# Patient Record
Sex: MALE | Race: WHITE | NOT HISPANIC OR LATINO | Employment: OTHER | URBAN - METROPOLITAN AREA
[De-identification: names, ages, dates, MRNs, and addresses within clinical notes are randomized per-mention and may not be internally consistent; named-entity substitution may affect disease eponyms.]

---

## 2022-08-16 PROBLEM — I47.20 SUSTAINED VT (VENTRICULAR TACHYCARDIA): Status: ACTIVE | Noted: 2022-08-16

## 2022-08-16 PROBLEM — I10 HTN (HYPERTENSION): Status: ACTIVE | Noted: 2022-08-16

## 2022-08-16 PROBLEM — R94.31 PROLONGATION OF QRS COMPLEX ON ELECTROCARDIOGRAPHY: Status: ACTIVE | Noted: 2022-08-16

## 2022-08-17 PROBLEM — I45.10 RBBB: Status: ACTIVE | Noted: 2022-08-17

## 2022-08-17 PROBLEM — I50.42 CHF (CONGESTIVE HEART FAILURE), NYHA CLASS II, CHRONIC, COMBINED: Status: ACTIVE | Noted: 2022-08-17

## 2022-08-17 PROBLEM — R00.1 SYMPTOMATIC BRADYCARDIA: Status: ACTIVE | Noted: 2022-08-17

## 2022-08-17 PROBLEM — I42.8 NON-ISCHEMIC CARDIOMYOPATHY: Status: ACTIVE | Noted: 2022-08-17

## 2023-01-09 ENCOUNTER — PATIENT MESSAGE (OUTPATIENT)
Dept: PEDIATRIC CARDIOLOGY | Facility: CLINIC | Age: 53
End: 2023-01-09
Payer: COMMERCIAL

## 2023-01-09 ENCOUNTER — TELEPHONE (OUTPATIENT)
Dept: PEDIATRIC CARDIOLOGY | Facility: CLINIC | Age: 53
End: 2023-01-09
Payer: COMMERCIAL

## 2023-01-09 DIAGNOSIS — Q24.9 ADULT CONGENITAL HEART DISEASE: ICD-10-CM

## 2023-01-09 DIAGNOSIS — R94.31 PROLONGATION OF QRS COMPLEX ON ELECTROCARDIOGRAPHY: Primary | ICD-10-CM

## 2023-01-09 DIAGNOSIS — Q21.3 TOF (TETRALOGY OF FALLOT): ICD-10-CM

## 2023-01-09 DIAGNOSIS — I42.8 NON-ISCHEMIC CARDIOMYOPATHY: ICD-10-CM

## 2023-01-09 DIAGNOSIS — I50.42 CHF (CONGESTIVE HEART FAILURE), NYHA CLASS II, CHRONIC, COMBINED: ICD-10-CM

## 2023-01-09 NOTE — TELEPHONE ENCOUNTER
Appt scheduled for 2/23 start time 1:30, all information left on voicemail including callback name and number. Appointment slips mailed to address on file. MY Chart message sent regarding appointments. Spoke with Mirtha MOSQUEDA regarding clinic notes, cath and echo reports faxed to our department.

## 2023-01-10 ENCOUNTER — DOCUMENTATION ONLY (OUTPATIENT)
Dept: PEDIATRIC CARDIOLOGY | Facility: HOSPITAL | Age: 53
End: 2023-01-10
Payer: COMMERCIAL

## 2023-01-10 NOTE — PROGRESS NOTES
I reviewed medical records from [facility] in [facility].  Patient seen there September 23, 2022.    1. History of tetralogy of Fallot repair   - echocardiogram September 23, 2022 with reportedly mild to moderate pulmonary insufficiency and mild-to-moderate tricuspid insufficiency estimating right ventricular systolic pressure 55 mmHg.  Reportedly normal gradient across the pulmonary valve although the reported high-pitched systolic murmur would suggest the possibility of pulmonary stenosis.  - LPA narrowing on CTA 8/16/22 at [facility].  2. History of ventricular tachycardia status post ICD implantation  3. Reported history of pulmonary hypertension  4. Reported history of nonischemic cardiomyopathy  - ejection fraction 55% on most recent echocardiogram  5. Hypertension  6. Family history of coronary artery disease, father with history  7. Mild aortic insufficiency    In clinic his blood pressure was 130/79, heart rate 60, BMI 23.5.  A high-pitched harsh mid systolic murmur was present with radiation to the neck.    Patient was on Aldactone 25 mg twice a day, amiodarone, carvedilol 6.25 mg twice a day, Entresto 49/51 mg twice a day, Lasix 20 mg as needed, and Zyrtec.    Patient had blood work performed August 23, 2022.  NT pro BNP elevated at 252 with 121 being the upper limit of normal.  Basic metabolic panel normal with creatinine 1.2, potassium 5.  Sodium 138.  Calcium 10.      Blood work July 21, 2022:  Total cholesterol 186, HDL 39, , non HDL cholesterol elevated at 147.  Albumin 4.3.  Bilirubin upper limit of normal at 1.8.  AST and ALT normal.      Transthoracic echocardiogram September 23, 2022 with normal left ventricular systolic function, ejection fraction 55%.  No evidence of diastolic dysfunction.  Mild asymmetric septal left ventricular hypertrophy present.  Global longitudinal strain-16.8%.  Mild left atrial enlargement.  Reportedly with mild to moderate  pulmonary insufficiency and mild-to-moderate tricuspid insufficiency with estimated pulmonary artery systolic pressure 55 mmHg.  Peak velocity across the pulmonary valve reported as 0.5 m/sec.  Right ventricular function reported as normal.  TAPSE 2.2 cm.  Aortic root 3.1 cm.  Aortic valve with mild aortic insufficiency.    EKG performed September 23, 2022 with AV sequential pacing.    Patient had a chest CTA August 16, 2022 at [facility]:  1. No evidence of pulmonary embolism  2. Marked narrowing of the proximal aspect of the left main pulmonary artery and diffuse enlargement of the right pulmonary artery     I am seeing this patient February 23, 2023.  We will request a copy of the CTA.  May consider cardiac catheterization to treat LPA stenosis.  We will review echocardiogram at that time and consider possible need for a cardiac MRI.

## 2023-02-06 ENCOUNTER — TELEPHONE (OUTPATIENT)
Dept: TRANSPLANT | Facility: CLINIC | Age: 53
End: 2023-02-06
Payer: COMMERCIAL

## 2023-02-06 DIAGNOSIS — R06.82 TACHYPNEA: Primary | ICD-10-CM

## 2023-02-06 DIAGNOSIS — I27.9 CHRONIC PULMONARY HEART DISEASE: ICD-10-CM

## 2023-02-06 DIAGNOSIS — Z79.899 POLYPHARMACY: ICD-10-CM

## 2023-03-02 ENCOUNTER — OFFICE VISIT (OUTPATIENT)
Dept: TRANSPLANT | Facility: CLINIC | Age: 53
End: 2023-03-02
Payer: COMMERCIAL

## 2023-03-02 ENCOUNTER — LAB VISIT (OUTPATIENT)
Dept: LAB | Facility: HOSPITAL | Age: 53
End: 2023-03-02
Attending: INTERNAL MEDICINE
Payer: COMMERCIAL

## 2023-03-02 VITALS
WEIGHT: 159.19 LBS | DIASTOLIC BLOOD PRESSURE: 83 MMHG | SYSTOLIC BLOOD PRESSURE: 154 MMHG | HEIGHT: 69 IN | OXYGEN SATURATION: 96 % | HEART RATE: 67 BPM | BODY MASS INDEX: 23.58 KG/M2

## 2023-03-02 DIAGNOSIS — R06.82 TACHYPNEA: ICD-10-CM

## 2023-03-02 DIAGNOSIS — Z79.899 POLYPHARMACY: ICD-10-CM

## 2023-03-02 DIAGNOSIS — I27.20 PULMONARY HYPERTENSION: ICD-10-CM

## 2023-03-02 LAB
ALBUMIN SERPL BCP-MCNC: 4.7 G/DL (ref 3.5–5.2)
ALP SERPL-CCNC: 46 U/L (ref 55–135)
ALT SERPL W/O P-5'-P-CCNC: 32 U/L (ref 10–44)
ANION GAP SERPL CALC-SCNC: 7 MMOL/L (ref 8–16)
AST SERPL-CCNC: 24 U/L (ref 10–40)
BASOPHILS # BLD AUTO: 0.09 K/UL (ref 0–0.2)
BASOPHILS NFR BLD: 0.9 % (ref 0–1.9)
BILIRUB SERPL-MCNC: 1.5 MG/DL (ref 0.1–1)
BNP SERPL-MCNC: 177 PG/ML (ref 0–99)
BUN SERPL-MCNC: 15 MG/DL (ref 6–20)
CALCIUM SERPL-MCNC: 10.5 MG/DL (ref 8.7–10.5)
CHLORIDE SERPL-SCNC: 105 MMOL/L (ref 95–110)
CO2 SERPL-SCNC: 28 MMOL/L (ref 23–29)
CREAT SERPL-MCNC: 1.3 MG/DL (ref 0.5–1.4)
DIFFERENTIAL METHOD: ABNORMAL
EOSINOPHIL # BLD AUTO: 0.8 K/UL (ref 0–0.5)
EOSINOPHIL NFR BLD: 8.1 % (ref 0–8)
ERYTHROCYTE [DISTWIDTH] IN BLOOD BY AUTOMATED COUNT: 13.4 % (ref 11.5–14.5)
EST. GFR  (NO RACE VARIABLE): >60 ML/MIN/1.73 M^2
GLUCOSE SERPL-MCNC: 96 MG/DL (ref 70–110)
HCT VFR BLD AUTO: 45.7 % (ref 40–54)
HGB BLD-MCNC: 14.2 G/DL (ref 14–18)
IMM GRANULOCYTES # BLD AUTO: 0.01 K/UL (ref 0–0.04)
IMM GRANULOCYTES NFR BLD AUTO: 0.1 % (ref 0–0.5)
LYMPHOCYTES # BLD AUTO: 3.9 K/UL (ref 1–4.8)
LYMPHOCYTES NFR BLD: 39.2 % (ref 18–48)
MAGNESIUM SERPL-MCNC: 2.3 MG/DL (ref 1.6–2.6)
MCH RBC QN AUTO: 31.3 PG (ref 27–31)
MCHC RBC AUTO-ENTMCNC: 31.1 G/DL (ref 32–36)
MCV RBC AUTO: 101 FL (ref 82–98)
MONOCYTES # BLD AUTO: 0.8 K/UL (ref 0.3–1)
MONOCYTES NFR BLD: 8 % (ref 4–15)
NEUTROPHILS # BLD AUTO: 4.3 K/UL (ref 1.8–7.7)
NEUTROPHILS NFR BLD: 43.7 % (ref 38–73)
NRBC BLD-RTO: 0 /100 WBC
PLATELET # BLD AUTO: 250 K/UL (ref 150–450)
PMV BLD AUTO: 10.6 FL (ref 9.2–12.9)
POTASSIUM SERPL-SCNC: 5 MMOL/L (ref 3.5–5.1)
PROT SERPL-MCNC: 8.2 G/DL (ref 6–8.4)
RBC # BLD AUTO: 4.53 M/UL (ref 4.6–6.2)
SODIUM SERPL-SCNC: 140 MMOL/L (ref 136–145)
WBC # BLD AUTO: 9.9 K/UL (ref 3.9–12.7)

## 2023-03-02 PROCEDURE — 85025 COMPLETE CBC W/AUTO DIFF WBC: CPT | Performed by: INTERNAL MEDICINE

## 2023-03-02 PROCEDURE — 83880 ASSAY OF NATRIURETIC PEPTIDE: CPT | Performed by: INTERNAL MEDICINE

## 2023-03-02 PROCEDURE — 99999 PR PBB SHADOW E&M-EST. PATIENT-LVL III: ICD-10-PCS | Mod: PBBFAC,,, | Performed by: INTERNAL MEDICINE

## 2023-03-02 PROCEDURE — 99204 PR OFFICE/OUTPT VISIT, NEW, LEVL IV, 45-59 MIN: ICD-10-PCS | Mod: S$GLB,,, | Performed by: INTERNAL MEDICINE

## 2023-03-02 PROCEDURE — 36415 COLL VENOUS BLD VENIPUNCTURE: CPT | Performed by: INTERNAL MEDICINE

## 2023-03-02 PROCEDURE — 83735 ASSAY OF MAGNESIUM: CPT | Performed by: INTERNAL MEDICINE

## 2023-03-02 PROCEDURE — 99999 PR PBB SHADOW E&M-EST. PATIENT-LVL III: CPT | Mod: PBBFAC,,, | Performed by: INTERNAL MEDICINE

## 2023-03-02 PROCEDURE — 99204 OFFICE O/P NEW MOD 45 MIN: CPT | Mod: S$GLB,,, | Performed by: INTERNAL MEDICINE

## 2023-03-02 PROCEDURE — 80053 COMPREHEN METABOLIC PANEL: CPT | Performed by: INTERNAL MEDICINE

## 2023-03-02 NOTE — PROGRESS NOTES
Subjective:    Patient ID:  Cristobal Kidd is a 52 y.o. male who presents for evaluation of possible PH.    53 YO M w/ complex congenital heart disease followed by [facility] in [facility].    He has:  1. History of tetralogy of Fallot repair   - echocardiogram September 23, 2022 with reportedly mild to moderate pulmonary insufficiency and mild-to-moderate tricuspid insufficiency estimating right ventricular systolic pressure 55 mmHg.  Reportedly normal gradient across the pulmonary valve although the reported high-pitched systolic murmur would suggest the possibility of pulmonary stenosis.  - LPA narrowing on CTA 8/16/22 at [facility].  2. History of ventricular tachycardia status post ICD implantation  3. Reported history of pulmonary hypertension  4. Reported history of nonischemic cardiomyopathy  - EF on TTE has ranged from 30-35% to 55%  5. Hypertension  6. Family history of coronary artery disease, father with history  7. Mild aortic insufficiency    He presents today for evaluation of possible pulmonary hypertension as noted on this echocardiogram.  Primarily sees [facility] [place] [facility] in [place].  He was scheduled to see Dr. Galvan with the adult congenital heart disease, however the appointment was canceled due to scheduling issues.    He says that he is still working.  He is a business owner, owning a pet shop.  His job involves him walking a lot, continue care work.  Aside from this he does not have a regular exercise routine.  He denies any exertional chest discomfort, but does note shortness of breath with exertion.  Says that walking on a level ground or day-to-day activities do not cause him much symptoms, but something above-average such as carrying 5-10 gal jugs as part of his job will cause him to pause because of shortness of breath.  Does note occasional episodes of palpitations, and episodes of lightheadedness.  The lightheadedness episodes are primarily orthostatic, but he  denies any exertional lightheadedness.  Has not had any taniya syncopal episodes.  Denies any leg swelling, PND, orthopnea, abdominal swelling, or changes in appetite.    He drinks alcohol socially.  Does not smoke cigarettes, but does use smokeless tobacco.  Denies any history of recreational drug use.  No history of taking medications such as Fen-Phen.  No industrial chemical exposure.    TTE 8/16/22  Final Impressions   1. The study quality is good.   2. Global left ventricular systolic function is severely decreased.     The left ventricular ejection fraction is 30 - 35%.     3. Grade I diastolic dysfunction.   4. Right ventricular systolic function is moderately decreased. The   right ventricle is mildly enlarged.     5. Right ventricular systolic pressure is 60 mmHg. Moderate (2+)   tricuspid regurgitation. The regurgitant jet is directed centrally.   Bowing of the atrial septum to the left is noted, suggestive of   increased right atrial pressure.     6. The right atrium is severly enlarged. Right atrial diameter is 5.5   cms.     7. The inferior vena cava is mildly increased in size. The inferior   vena cava changes greater than 50 percent during respiration.     8. Moderate pulmonic regurgitation.     CTA chest 8/16/22  1. No CT evidence for pulmonary artery thromboembolism.  2. Marked narrowing of the proximal aspect of the left main pulmonary artery and diffuse enlargement of the right pulmonary arteries with enlargement of the right heart chambers in this patient with provided history of tetralogy of Fallot.  3. Patchy consolidation the superior segment of the left lower lobe suspect for mild/early pneumonia.  4. Ground-glass attenuations within the right lung may indicate atelectatic change or mild pneumonitis versus air trapping.    Review of Systems   Constitutional: Negative for chills and fever.   HENT:  Negative for hearing loss.    Eyes:  Negative for visual disturbance.   Cardiovascular:  Positive  for dyspnea on exertion, irregular heartbeat and palpitations. Negative for chest pain, leg swelling, orthopnea, paroxysmal nocturnal dyspnea and syncope.   Respiratory:  Positive for shortness of breath. Negative for cough.    Musculoskeletal:  Negative for muscle weakness.   Gastrointestinal:  Negative for diarrhea, nausea and vomiting.   Neurological:  Negative for focal weakness.   Psychiatric/Behavioral:  Negative for memory loss.       Objective:      Vitals:    03/02/23 1413   BP: (!) 154/83   Pulse: 67   Physical Exam  Vitals reviewed.   Constitutional:       General: He is not in acute distress.  HENT:      Head: Atraumatic.   Eyes:      Extraocular Movements: Extraocular movements intact.   Cardiovascular:      Rate and Rhythm: Normal rate and regular rhythm.      Heart sounds: Murmur (S1, S2 noted. ESM heard best over pulmonary area.) heard.      Comments: JVP not visible at 45 degrees.  Pulmonary:      Breath sounds: Normal breath sounds.   Abdominal:      Palpations: Abdomen is soft.      Tenderness: There is no abdominal tenderness.   Musculoskeletal:         General: Normal range of motion.      Right lower leg: No edema.      Left lower leg: No edema.   Neurological:      General: No focal deficit present.      Mental Status: He is alert and oriented to person, place, and time.         Assessment:       1. Pulmonary hypertension         Plan:       Possible PH  TOF s/p repair  LPA stenosis  - he presents today for possible pulmonary hypertension.  This was noted on an echocardiogram done outside with elevated RVSP.  - of note, his workup outside also demonstrates a possibly stenosed proximal left pulmonary artery.  There was also concern for possible pulmonary stenosis.  - at this point I do not know if he has true pre capillary pulmonary hypertension.  The possible PH we could be seeing on would be from either the pulmonary artery stenosis, or possible pulmonary valve stenosis.  Possibility also of  post capillary pulmonary hypertension given the systolic dysfunction on echocardiogram.  - therefore I recommended that he continue to follow-up with Dr. Galvan as initially scheduled.  I will route my note to him so they can work on scheduling him in clinic.  Agree with their plan to evaluate for possible pulmonary valve stenosis, as well as pulmonary artery stenosis.  We will follow the results of right heart catheterization and evaluation

## 2023-03-07 ENCOUNTER — TELEPHONE (OUTPATIENT)
Dept: PEDIATRIC CARDIOLOGY | Facility: CLINIC | Age: 53
End: 2023-03-07
Payer: COMMERCIAL

## 2023-03-07 ENCOUNTER — PATIENT MESSAGE (OUTPATIENT)
Dept: PEDIATRIC CARDIOLOGY | Facility: CLINIC | Age: 53
End: 2023-03-07
Payer: COMMERCIAL

## 2023-03-07 DIAGNOSIS — I42.8 NON-ISCHEMIC CARDIOMYOPATHY: ICD-10-CM

## 2023-03-07 DIAGNOSIS — I47.20 SUSTAINED VT (VENTRICULAR TACHYCARDIA): ICD-10-CM

## 2023-03-07 DIAGNOSIS — I27.20 PULMONARY HYPERTENSION: Primary | ICD-10-CM

## 2023-03-07 DIAGNOSIS — I50.42 CHF (CONGESTIVE HEART FAILURE), NYHA CLASS II, CHRONIC, COMBINED: ICD-10-CM

## 2023-03-07 NOTE — TELEPHONE ENCOUNTER
Spoke with patient's spouse and scheduled appt on March 9 at 9am. Confirmed location of peds and [facility] clinics.

## 2023-03-07 NOTE — TELEPHONE ENCOUNTER
Returned phone call from nurse at [facility] [place] [facility] the [facility]. She stated that Dr. Encarnacion put in a referral for patient to be seen by Dr. Galvan in [facility]. Patient has history of tetralogy of fallot, s/p repair, s/p dual chamber pacemaker, and sustained VT requiring increased dose of amiodarone to 200mg BID. Dr. Encarnacion wants to perform an ablation due to concerns of lung issues with higher doses of amiodarone but he would like patient to see Dr. Galvan first and would like it sooner than June, which was the next available. I notified the nurse that I will call patient with options that could involve coming to clinic on two separate days in order to get testing and visit done sooner than June. She stated that she will fax over that most recent echo and clinic note from today's visit with patient. Confirmed fax number.

## 2023-03-07 NOTE — TELEPHONE ENCOUNTER
Attempted to call patient to schedule sooner appt but no answer, left voicemail with callback number.

## 2023-03-08 NOTE — PROGRESS NOTES
2023     re:Cristobal Kidd  :1970    Johnnie Ackerman MD  [address] [facility]  [facility] [id number]    Dr. Tejinder Encarnacion  [facility]     Dear Doctors:    Cristobal Kidd is a 52 y.o. male seen in my [facility] clinic today for evaluation of tetralogy of Fallot.  To summarize his diagnoses are as follow:  1. History of tetralogy of Fallot status post 2 surgeries at [facility], 1st at about 3 years of age, 2nd at about 7 years of age.  Both performed via median sternotomy.    - mild pulmonary stenosis and likely moderate pulmonary insufficiency  - severe proximal LPA narrowing on CTA 22 at [facility].  2. History of ventricular tachycardia status post Medtronic ICD implantation  - device and leads are MRI compatible.  Generator model number is [id number], pacer lead [id number], ICD lead [id number].   3. Reported history of pulmonary hypertension  4. Reported history of nonischemic cardiomyopathy  - currently with low normal left ventricular function  5. Hypertension  6. Family history of coronary artery disease, father with history  7. Mild aortic insufficiency with mild aortic root enlargement, typical for tetralogy    To summarize, my recommendations are as follows:  He is being scheduled for cardiac MRI.  I agree that an attempt at VT ablation is in order.  Will discuss him in our conference on Wednesday regarding timing.  See discussion below.  I anticipate catheter based stenting of the proximal left pulmonary artery, possibly with an additional pulmonary valve replacement, after review of his MRI.  I would recommend SBE prophylaxis before dental work.    Discussion:  1. He clearly has severe left pulmonary artery stenosis that is quite proximal.  I suspect he would be a good candidate for catheter based stent placement.  Documenting differential blood flow between the right and left lung would help with planning.  We should be able to  get this would the cardiac MRI.  I wonder if he could have had a shunt to that proximal left pulmonary artery or if that was at the insertion of the ductus arteriosus?  2. His pulmonary stenosis appears to be mild, but I am not clear on the severity of his pulmonary insufficiency.  He does have a lot of symptoms of exercise intolerance, and if he had significant pulmonary insufficiency he may benefit from placement of a catheter based pulmonary valve.  The MRI should help with this decision-making process.    3. I completely agree that an attempt at ablation for his ventricular tachycardia is in order.  I am concerned that 1 of the newer native outflow track catheter based pulmonary valves may cover the right ventricular outflow tract and prevent complete ablation of his VT.  It probably makes sense to do the ablation 1st.  I am going to discuss this in our Adult Congenital Heart Disease conference next Wednesday, and I will call the referring cardiologist.  4. I agree with goal-directed therapy for his decreased left ventricular function.  I certainly agree with the placement of the defibrillator.  He clearly is at very high risk for life-threatening ventricular arrhythmias.      History of present illness:  History is provided by the patient and his wife.  They are excellent historians.  He was born with tetralogy.  His 1st surgery was at 3 years of age and he then had a 2nd surgery when he was in the 4th grade.  He is not sure why he needed repeat surgery.  He then did very well until last year.  He was followed regularly by his primary cardiologist in [place].  He had a history of palpitations, but last August he had a long episode of ventricular tachycardia associated with significant shortness of breath.  He was admitted to the hospital and started on medications.  He spontaneously converted.  Subsequently had a transvenous defibrillator that is MRI compatible.  He recently had an episode of ventricular  tachycardia with a rate less than 200, below the shock zone for his defibrillator.  Symptoms lasted about an hour and 20 minutes.  He was told to increase his amiodarone dose from a baseline of 200 mg up to 400 mg for few weeks with plans to wean back down to 200 mg.    His major complaint is fatigue.  He does get short of breath easily with exertion, but that is unchanged.  Over the past year though he has definitely felt more fatigued.  He goes to bed very early.  He often wakes up early in the morning and is unable to fall back asleep.  No edema.  No orthopnea.  No baseline shortness of breath.  No complaints of chest pain.    He does have a cousin with a history of tetralogy.  Otherwise, family history is negative for congenital heart disease.    I reviewed medical records from [facility] [place] [facility] in [facility].  Patient seen there September 23, 2022.  In clinic his blood pressure was 130/79, heart rate 60, BMI 23.5.  A high-pitched harsh mid systolic murmur was present with radiation to the neck.  Patient was on Aldactone 25 mg twice a day, amiodarone, carvedilol 6.25 mg twice a day, Entresto 49/51 mg twice a day, Lasix 20 mg as needed, and Zyrtec.  Patient had blood work performed August 23, 2022.  NT pro BNP elevated at 252 with 121 being the upper limit of normal.  Basic metabolic panel normal with creatinine 1.2, potassium 5.  Sodium 138.  Calcium 10.  Blood work July 21, 2022:  Total cholesterol 186, HDL 39, , non HDL cholesterol elevated at 147.  Albumin 4.3.  Bilirubin upper limit of normal at 1.8.  AST and ALT normal.     Transthoracic echocardiogram September 23, 2022 with normal left ventricular systolic function, ejection fraction 55%.  No evidence of diastolic dysfunction.  Mild asymmetric septal left ventricular hypertrophy present.  Global longitudinal strain-16.8%.  Mild left atrial enlargement.  Reportedly with mild to moderate pulmonary insufficiency and  mild-to-moderate tricuspid insufficiency with estimated pulmonary artery systolic pressure 55 mmHg.  Peak velocity across the pulmonary valve reported as 0.5 m/sec.  Right ventricular function reported as normal.  TAPSE 2.2 cm.  Aortic root 3.1 cm.  Aortic valve with mild aortic insufficiency.    EKG performed September 23, 2022 with AV sequential pacing.    Patient had a chest CTA August 16, 2022 at [facility]:  1. No evidence of pulmonary embolism  2. Marked narrowing of the proximal aspect of the left main pulmonary artery and diffuse enlargement of the right pulmonary artery     The review of systems is as noted above. It is otherwise negative for other symptoms related to the general, neurological, psychiatric, endocrine, gastrointestinal, genitourinary, respiratory, dermatologic, musculoskeletal, hematologic, and immunologic systems.    No past medical history on file.  No past surgical history on file.  Family History   Problem Relation Age of Onset    Diabetes Mother     Heart attack Father     No Known Problems Sister     No Known Problems Sister     No Known Problems Sister      Social History     Socioeconomic History    Marital status:    Tobacco Use    Smoking status: Never     Passive exposure: Never    Smokeless tobacco: Current     Types: Snuff     Current Outpatient Medications on File Prior to Visit   Medication Sig Dispense Refill    amiodarone (PACERONE) 200 MG Tab 400 mg by mouth twice daily x7 days then 200 mg by mouth twice daily x7 days then 200 mg daily (Patient taking differently: 400 mg by mouth twice daily x7 days then 200 mg by mouth twice daily x7 days then 200 mg daily    Patient Med list states: 200 mg tablet; take 1 tablet twice daily for 2 weeks, then 1 1/2 tablet once a day. Patient will clarify with prescribing MD) 120 tablet 11    carvediloL (COREG) 6.25 MG tablet Take 1 tablet (6.25 mg total) by mouth 2 (two) times daily. 60 tablet 11    furosemide (LASIX) 20 MG  tablet Daily as needed for weight gain 2-3 lb in 24 hours, shortness of breath, swelling 30 tablet 11    sacubitriL-valsartan (ENTRESTO) 49-51 mg per tablet Take 1 tablet by mouth 2 (two) times daily. 60 tablet 10    spironolactone (ALDACTONE) 25 MG tablet Take 1 tablet (25 mg total) by mouth 2 (two) times daily. 60 tablet 11    [DISCONTINUED] metoprolol tartrate (LOPRESSOR) 25 MG tablet Take 25 mg by mouth 2 (two) times daily.      [DISCONTINUED] valsartan (DIOVAN) 160 MG tablet Take 160 mg by mouth once daily.       No current facility-administered medications on file prior to visit.     Review of patient's allergies indicates:  No Known Allergies    Vitals:    03/09/23 1052   BP: (!) 158/73   BP Location: Left arm   Patient Position: Sitting   Pulse: 62   SpO2: 98%       In general, he is a very healthy-appearing nondysmorphic male in no apparent distress.  The eyes, nares, and oropharynx are clear.  Eyelids and conjunctiva are normal without drainage or erythema.  Pupils equal and round bilaterally.  The head is normocephalic and atraumatic.  The neck is supple without jugular venous distention or thyroid enlargement.  The lungs are clear to auscultation bilaterally.  Well-healed median sternotomy incision noted.  No evidence of thoracotomy incision.  First heart sound is normal, 2nd heart sound is fixed and split.  There is a grade 2/6 systolic ejection murmur at the upper left sternal border followed by a grade 1-2/6 blowing diastolic murmur.  The abdominal exam is benign without hepatosplenomegaly, tenderness, or distention.  Pulses are normal in all 4 extremities with brisk capillary refill and no clubbing, cyanosis, or edema.  No rashes are noted.    I personally reviewed the following tests performed today and my interpretation follows:      The official echo report is pending.  I reviewed that study in detail.  My interpretation is as follows:  1. Moderately enlarged right ventricle with likely low normal  to mildly reduced function  2. Dyskinetic interventricular septum with good motion of the rest of the left ventricle.  I estimate an ejection fraction around 50%.  3. Very mild aortic root enlargement with trivial aortic insufficiency.  4. Right pulmonary artery free of obstruction and very well imaged.  Left pulmonary artery not well seen.    5. Mild tricuspid insufficiency associated with the ICD lead estimates a right ventricular systolic pressure around 50 mmHg.  6. Likely mild pulmonary valve stenosis with peak gradient likely around 22-25 mmHg.  7. I estimate moderate pulmonary valve insufficiency although it is difficult to grade.  8. No residual VSD or ASD noted.      Thank you for referring this patient to our clinic.  Please call with any questions.    Sincerely,        John Galvan MD  Pediatric Cardiology  Adult Congenital Heart Disease  Pediatric Heart Failure and Transplantation  [facility] [address] [place]  [address]  [phone]

## 2023-03-09 ENCOUNTER — HOSPITAL ENCOUNTER (OUTPATIENT)
Dept: PEDIATRIC CARDIOLOGY | Facility: HOSPITAL | Age: 53
Discharge: HOME | End: 2023-03-09
Attending: PEDIATRICS
Payer: COMMERCIAL

## 2023-03-09 ENCOUNTER — OFFICE VISIT (OUTPATIENT)
Dept: CARDIOLOGY | Facility: CLINIC | Age: 53
End: 2023-03-09
Payer: COMMERCIAL

## 2023-03-09 ENCOUNTER — HOSPITAL ENCOUNTER (OUTPATIENT)
Dept: CARDIOLOGY | Facility: CLINIC | Age: 53
Discharge: HOME | End: 2023-03-09
Payer: COMMERCIAL

## 2023-03-09 VITALS — DIASTOLIC BLOOD PRESSURE: 73 MMHG | OXYGEN SATURATION: 98 % | SYSTOLIC BLOOD PRESSURE: 158 MMHG | HEART RATE: 62 BPM

## 2023-03-09 DIAGNOSIS — I50.42 CHF (CONGESTIVE HEART FAILURE), NYHA CLASS II, CHRONIC, COMBINED: ICD-10-CM

## 2023-03-09 DIAGNOSIS — Q25.6 CONGENITAL STENOSIS OF LEFT PULMONARY ARTERY: ICD-10-CM

## 2023-03-09 DIAGNOSIS — I42.8 NON-ISCHEMIC CARDIOMYOPATHY: ICD-10-CM

## 2023-03-09 DIAGNOSIS — Q24.9 CONGENITAL MALFORMATION OF HEART, UNSPECIFIED: Primary | ICD-10-CM

## 2023-03-09 DIAGNOSIS — Q21.3 TETRALOGY OF FALLOT: ICD-10-CM

## 2023-03-09 DIAGNOSIS — Q24.9 ADULT CONGENITAL HEART DISEASE: ICD-10-CM

## 2023-03-09 DIAGNOSIS — I37.1 NONRHEUMATIC PULMONARY VALVE INSUFFICIENCY: ICD-10-CM

## 2023-03-09 DIAGNOSIS — I45.10 RBBB: ICD-10-CM

## 2023-03-09 DIAGNOSIS — Q21.3 TOF (TETRALOGY OF FALLOT): ICD-10-CM

## 2023-03-09 DIAGNOSIS — I37.0 NONRHEUMATIC PULMONARY VALVE STENOSIS: ICD-10-CM

## 2023-03-09 DIAGNOSIS — I27.20 PULMONARY HYPERTENSION: ICD-10-CM

## 2023-03-09 DIAGNOSIS — I47.20 SUSTAINED VT (VENTRICULAR TACHYCARDIA): ICD-10-CM

## 2023-03-09 DIAGNOSIS — Z87.74 TETRALOGY OF FALLOT S/P REPAIR: ICD-10-CM

## 2023-03-09 PROCEDURE — 99999 PR PBB SHADOW E&M-EST. PATIENT-LVL III: ICD-10-PCS | Mod: PBBFAC,,, | Performed by: PEDIATRICS

## 2023-03-09 PROCEDURE — 99999 PR PBB SHADOW E&M-EST. PATIENT-LVL III: CPT | Mod: PBBFAC,,, | Performed by: PEDIATRICS

## 2023-03-09 PROCEDURE — 99245 PR OFFICE CONSULTATION,LEVEL V: ICD-10-PCS | Mod: S$GLB,,, | Performed by: PEDIATRICS

## 2023-03-09 PROCEDURE — 99245 OFF/OP CONSLTJ NEW/EST HI 55: CPT | Mod: S$GLB,,, | Performed by: PEDIATRICS

## 2023-03-29 ENCOUNTER — DOCUMENTATION ONLY (OUTPATIENT)
Dept: PEDIATRIC CARDIOLOGY | Facility: CLINIC | Age: 53
End: 2023-03-29
Payer: COMMERCIAL

## 2023-03-29 NOTE — PROGRESS NOTES
Patient discussed in our adult congenital heart disease conference 3/15/23.  Members of our congenital cardiac surgery, interventional cardiology, electrophysiology, and adult congenital cardiology teams were present.     Patient with significant left pulmonary artery stenosis and likely moderate pulmonary insufficiency.  Planning a cardiac MRI in the next few months to assess severity of pulmonary insufficiency, right ventricular enlargement.  Will also assess differential blood flow with the MRI.  If there is significant pulmonary insufficiency, catheter based pulmonary valve replacement may be possible along with intervention on the left pulmonary artery.  Patient has a history of ventricular tachycardia and has a defibrillator.  His primary electrophysiologist, Dr. Encarnacion, is considering an ablation.  Catheter based pulmonary valve replacement can sometimes cover parts of the right ventricular outflow tract that would ideally be included in an ablation procedure for ventricular tachycardia.  For this reason, we would recommend an ablation prior to catheter based intervention.      I called and left a message with Dr. Encarnacion's office.  We will also send this message to him.  I would like to discuss timing of his ablation.  If Dr. Encarnacion would like to do this procedure, they can get it scheduled.  If he would like our EP team to be involved, I can get them to evaluate the patient.  I will call the patient once I discuss with Dr. Encarnacion.

## 2023-04-11 ENCOUNTER — DOCUMENTATION ONLY (OUTPATIENT)
Dept: CARDIOLOGY | Facility: HOSPITAL | Age: 53
End: 2023-04-11
Payer: COMMERCIAL

## 2023-04-11 DIAGNOSIS — R00.1 SYMPTOMATIC BRADYCARDIA: ICD-10-CM

## 2023-04-11 DIAGNOSIS — I47.20 SUSTAINED VT (VENTRICULAR TACHYCARDIA): Primary | ICD-10-CM

## 2023-04-11 NOTE — PROGRESS NOTES
Received a call/message from Spreaker in the MRI Department in relation to this patient needing to be scheduled for a MRI and has a Medtronic Pacemaker.  Patient's Device is MRI compatible/conditional.  To meet protocol the Pacemaker/ICD must have been implanted no less than 6 weeks prior to scheduled date of Scan.  ICD/PPM and leads must be from same .  MRI informed ordering MD must input a Cardiac Device Check in clinic and hospital order, patient must have an xray within 6 months or less prior to MRI reprogramming.  Chest xray to be reviewed by Radiologist.     Cardiac MRI is scheduled on 2023.  [employer] has agreed to be available for the MRI.    Patient is not followed here.  Please call [phone] for reprogramming parameters after interrogation.     Date of Implant: 2022  Generator: Medtronic Evera MRI XT Sure Scan  SN# [id number]  RA Lead:  Medtronic 4076-52 cm. SN [id number]  Sensin.9mV, slew: 1.1V/s, Resistance: 582 ohms, pacing threshold: 0.5 @ 0.4V, Current: 1.3mA  RV Lead:   6935-65cm SN#[id number]  Sensin.9mV, Slew 3.5V/s Impedence: 532 ohms, RV: 67,  Capture Threshold: 0.5 @ 0.4 V, Current 1.5mA

## 2023-06-01 ENCOUNTER — HOSPITAL ENCOUNTER (OUTPATIENT)
Dept: RADIOLOGY | Facility: HOSPITAL | Age: 53
Discharge: HOME | End: 2023-06-01
Attending: PEDIATRICS
Payer: COMMERCIAL

## 2023-06-01 VITALS — HEART RATE: 60 BPM

## 2023-06-01 DIAGNOSIS — Q24.9 CONGENITAL MALFORMATION OF HEART, UNSPECIFIED: ICD-10-CM

## 2023-06-01 DIAGNOSIS — Q21.3 TETRALOGY OF FALLOT: ICD-10-CM

## 2023-06-01 PROCEDURE — 75561 CV CARDIAC MRI MORPHOLOGY (CUPID ONLY): ICD-10-PCS | Mod: 26,,, | Performed by: PEDIATRICS

## 2023-06-01 PROCEDURE — 75561 CARDIAC MRI FOR MORPH W/DYE: CPT | Mod: TC

## 2023-06-01 PROCEDURE — 75561 MRI CARDIAC MORPHOLOGY FUNCTION W WO: ICD-10-PCS | Mod: 26,,, | Performed by: STUDENT IN AN ORGANIZED HEALTH CARE EDUCATION/TRAINING PROGRAM

## 2023-06-01 PROCEDURE — A9577 INJ MULTIHANCE: HCPCS | Performed by: PEDIATRICS

## 2023-06-01 PROCEDURE — 25500020 PHARM REV CODE 255: Performed by: PEDIATRICS

## 2023-06-01 PROCEDURE — 75561 CARDIAC MRI FOR MORPH W/DYE: CPT | Mod: 26,,, | Performed by: STUDENT IN AN ORGANIZED HEALTH CARE EDUCATION/TRAINING PROGRAM

## 2023-06-01 PROCEDURE — 75561 CARDIAC MRI FOR MORPH W/DYE: CPT | Mod: 26,,, | Performed by: PEDIATRICS

## 2023-06-01 RX ADMIN — GADOBENATE DIMEGLUMINE 32 ML: 529 INJECTION, SOLUTION INTRAVENOUS at 09:06

## 2023-06-01 NOTE — PROGRESS NOTES
MRI done pt tolerated well / ambulatory to Zone 3 and sitting in chair waiting on Clementina with [employer]  to placed device to normal mode / AAO w/ NAD

## 2023-06-01 NOTE — PROGRESS NOTES
Pt arrived to MRI and escorted to Zone 3 / Clementina with [employer] placed device to MRI safe mode and pt to table / IV established and placed to monitor / pt AAO w/ NAD and scan to begin 09:10

## 2023-06-02 ENCOUNTER — TELEPHONE (OUTPATIENT)
Dept: CARDIOLOGY | Facility: CLINIC | Age: 53
End: 2023-06-02
Payer: COMMERCIAL

## 2023-06-02 NOTE — TELEPHONE ENCOUNTER
Left callback name and number on voicemail  ----- Message from Era Russell sent at 6/2/2023  9:49 AM CDT -----  Contact: THE PT  [phone]  [email or web address]     Patient is calling for Medical Advice regarding:    How long has patient had these symptoms:    Pharmacy name and phone#:    Would like response via [employer]:      Comments: The pt is requesting a call back . He is calling about a apt that was canceled

## 2023-06-14 ENCOUNTER — TELEPHONE (OUTPATIENT)
Dept: CARDIOLOGY | Facility: CLINIC | Age: 53
End: 2023-06-14
Payer: COMMERCIAL

## 2023-06-15 ENCOUNTER — TELEPHONE (OUTPATIENT)
Dept: CARDIOLOGY | Facility: CLINIC | Age: 53
End: 2023-06-15
Payer: COMMERCIAL

## 2023-06-15 ENCOUNTER — TELEPHONE (OUTPATIENT)
Dept: PEDIATRIC CARDIOLOGY | Facility: CLINIC | Age: 53
End: 2023-06-15
Payer: COMMERCIAL

## 2023-06-15 NOTE — TELEPHONE ENCOUNTER
Appointment switched to a virtual visit, left all information on voicemail including callback name and number   ----- Message from Marla Oglesby sent at 6/15/2023 12:46 PM CDT -----  Contact: Satnam [phone]  Patient is returning a phone call.    Who left a message for the patient: --Grace--    Does patient know what this is regarding:  --Video visit--    Would you like a call back, or a response through your [facility] portal?:  --Call back      Comments:  Pt states that he can do the video visit. Please advise.

## 2023-06-15 NOTE — TELEPHONE ENCOUNTER
Results of MRI discussed with the patient.  He clearly meets criteria for pulmonary valve replacement and intervention on the stenotic left pulmonary artery.  Although I am hopeful he will be a candidate for catheter based intervention, I am certainly not sure of that.  Did have a CT scan within the last year, but I do not think it is going to be adequate to gauge candidacy for an Altera valve.  I will have Dr. Meyer review and get back to the patient.    He did have his VT ablation, and he definitely feels better from an arrhythmia standpoint.  That said, he still has significant exercise intolerance.

## 2023-06-21 ENCOUNTER — TELEPHONE (OUTPATIENT)
Dept: PEDIATRIC CARDIOLOGY | Facility: CLINIC | Age: 53
End: 2023-06-21
Payer: COMMERCIAL

## 2023-06-21 ENCOUNTER — PATIENT MESSAGE (OUTPATIENT)
Dept: CARDIOLOGY | Facility: CLINIC | Age: 53
End: 2023-06-21
Payer: COMMERCIAL

## 2023-06-21 DIAGNOSIS — Q24.9 CONGENITAL MALFORMATION OF HEART, UNSPECIFIED: ICD-10-CM

## 2023-06-21 NOTE — TELEPHONE ENCOUNTER
Message left on phone and portal message sent:  I reviewed your MRI with Dr. Meyer and Dr. Nguyen, our catheterization doctors.  They are optimistic that they can fix the narrow pulmonary artery and put in a new valve with a catheter based approach.  You would go home the next day with a Band-Aid on your leg.  Although I think you are a good candidate, we need a little more testing to be absolutely sure.  There is a special CT scan that we then sent to the company to make sure the valve is a good fit.  I would like to get you scheduled for this.  It would happen in [place] at [facility].  Is it okay for me to have my nurse contact you?

## 2023-07-03 ENCOUNTER — HOSPITAL ENCOUNTER (OUTPATIENT)
Dept: RADIOLOGY | Facility: HOSPITAL | Age: 53
Discharge: HOME | End: 2023-07-03
Attending: PEDIATRICS
Payer: COMMERCIAL

## 2023-07-03 DIAGNOSIS — Q24.9 CONGENITAL MALFORMATION OF HEART, UNSPECIFIED: ICD-10-CM

## 2023-07-03 PROCEDURE — 75573 CT CARDIAC WITH CONTRAST FOR CONGENITAL: ICD-10-PCS | Mod: 26,,, | Performed by: RADIOLOGY

## 2023-07-03 PROCEDURE — 75573 CT HRT C+ STRUX CGEN HRT DS: CPT | Mod: 26,,, | Performed by: RADIOLOGY

## 2023-07-03 PROCEDURE — 25500020 PHARM REV CODE 255: Performed by: PEDIATRICS

## 2023-07-03 PROCEDURE — 75573 CT HRT C+ STRUX CGEN HRT DS: CPT | Mod: TC

## 2023-07-03 RX ADMIN — IOHEXOL 100 ML: 350 INJECTION, SOLUTION INTRAVENOUS at 12:07

## 2023-08-03 ENCOUNTER — PATIENT MESSAGE (OUTPATIENT)
Dept: CARDIOLOGY | Facility: CLINIC | Age: 53
End: 2023-08-03
Payer: COMMERCIAL

## 2023-08-13 ENCOUNTER — PATIENT MESSAGE (OUTPATIENT)
Dept: CARDIOLOGY | Facility: CLINIC | Age: 53
End: 2023-08-13
Payer: COMMERCIAL

## 2023-08-29 ENCOUNTER — PATIENT MESSAGE (OUTPATIENT)
Dept: PEDIATRIC CARDIOLOGY | Facility: CLINIC | Age: 53
End: 2023-08-29
Payer: COMMERCIAL

## 2023-08-31 ENCOUNTER — PATIENT MESSAGE (OUTPATIENT)
Dept: PEDIATRIC CARDIOLOGY | Facility: CLINIC | Age: 53
End: 2023-08-31
Payer: COMMERCIAL

## 2023-09-01 ENCOUNTER — TELEPHONE (OUTPATIENT)
Dept: PEDIATRIC CARDIOLOGY | Facility: CLINIC | Age: 53
End: 2023-09-01
Payer: COMMERCIAL

## 2023-09-01 NOTE — TELEPHONE ENCOUNTER
Spoke to pt regarding schedule PPVR. Agreed to pre cath visit with Dr. Chen on 9/27 with PPVR scheduled on 9/28. Dr. Galvan updated at this time.

## 2023-09-01 NOTE — TELEPHONE ENCOUNTER
----- Message from Sivan Guerrero RN sent at 8/11/2023  1:30 PM CDT -----  Regarding: Alterra valve  Dr. Galvan requested for pt to be evaluated for Alterra valve. CTA uploaded to [facility] and email sent to [facility] team 8/11/23

## 2023-09-11 ENCOUNTER — PATIENT MESSAGE (OUTPATIENT)
Dept: PEDIATRIC CARDIOLOGY | Facility: CLINIC | Age: 53
End: 2023-09-11
Payer: COMMERCIAL

## 2023-09-20 DIAGNOSIS — I37.1 NONRHEUMATIC PULMONARY VALVE INSUFFICIENCY: ICD-10-CM

## 2023-09-20 DIAGNOSIS — Z87.74 TETRALOGY OF FALLOT S/P REPAIR: Primary | ICD-10-CM

## 2023-09-20 DIAGNOSIS — Q24.9 ADULT CONGENITAL HEART DISEASE: ICD-10-CM

## 2023-09-20 DIAGNOSIS — I37.0 NONRHEUMATIC PULMONARY VALVE STENOSIS: ICD-10-CM

## 2023-09-27 ENCOUNTER — OFFICE VISIT (OUTPATIENT)
Dept: CARDIOLOGY | Facility: CLINIC | Age: 53
End: 2023-09-27
Payer: COMMERCIAL

## 2023-09-27 VITALS
BODY MASS INDEX: 23.35 KG/M2 | OXYGEN SATURATION: 97 % | HEART RATE: 60 BPM | HEIGHT: 69 IN | WEIGHT: 157.63 LBS | DIASTOLIC BLOOD PRESSURE: 79 MMHG | SYSTOLIC BLOOD PRESSURE: 150 MMHG

## 2023-09-27 DIAGNOSIS — Q25.6 CONGENITAL STENOSIS OF LEFT PULMONARY ARTERY: ICD-10-CM

## 2023-09-27 DIAGNOSIS — Q24.9 ADULT CONGENITAL HEART DISEASE: Primary | ICD-10-CM

## 2023-09-27 DIAGNOSIS — Z87.74 TETRALOGY OF FALLOT S/P REPAIR: ICD-10-CM

## 2023-09-27 DIAGNOSIS — I47.20 VENTRICULAR TACHYCARDIA: ICD-10-CM

## 2023-09-27 DIAGNOSIS — I37.2 NONRHEUMATIC PULMONARY VALVE STENOSIS WITH INSUFFICIENCY: ICD-10-CM

## 2023-09-27 PROCEDURE — 99999 PR PBB SHADOW E&M-EST. PATIENT-LVL III: ICD-10-PCS | Mod: PBBFAC,,, | Performed by: PEDIATRICS

## 2023-09-27 PROCEDURE — 99214 OFFICE O/P EST MOD 30 MIN: CPT | Mod: S$GLB,,, | Performed by: PEDIATRICS

## 2023-09-27 PROCEDURE — 99999 PR PBB SHADOW E&M-EST. PATIENT-LVL III: CPT | Mod: PBBFAC,,, | Performed by: PEDIATRICS

## 2023-09-27 PROCEDURE — 99214 PR OFFICE/OUTPT VISIT, EST, LEVL IV, 30-39 MIN: ICD-10-PCS | Mod: S$GLB,,, | Performed by: PEDIATRICS

## 2023-09-27 NOTE — LETTER
"September 27, 2023    Cristobal Kidd III  [address] [place] [address]             [facility] [address] [address]  Phone: [phone] 09/27/2023    Dear Mr. Kidd,       We have scheduled a cardiac catheterization for you on Thursday, September 27, 2023. You should check in on the third floor of the hospita at 6:30AM. Take the "Gold" elevators to the 3rd floor- follow signs for the Cath Lab waiting room, check in at the desk.     The hospital is located at [address] [place] [address].  [id number]    You should not have any solids or milk products after Midnight the morning of the procedure. You may have clear liquids such as water, apple juice, sprite until 5:30 AM.  Eating or drinking after the above listed time could result in cancellation of the procedure.     Please anticipate at least a one night stay at the hospital.    Do NOT take DIURETICS the morning of your procedure.     Please feel free to call with any questions or concerns. [phone] or [phone]     Sincerely    Sivan Guerrero, RN,BSN  Patient Care Coordinator  Pediatric Cardiology  [facility]/ Adult Congenital Cardiology       "

## 2023-09-27 NOTE — PROGRESS NOTES
Thank you for referring your patient Cristobal Kidd III to the cardiology clinic for consultation. The patient is accompanied by his  wife . Please review my findings below.    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  As you know, he is a 53-year-old male with the following cardiac diagnoses:  1. History of tetralogy of Fallot status post 2 surgeries at [facility] 1st at about 3 years of age, 2nd at about 7 years of age.  Both performed via median sternotomy.    - mild pulmonary stenosis and likely moderate pulmonary insufficiency  - severe proximal LPA narrowing on CTA 8/16/22 at [facility].  2. History of ventricular tachycardia status post Medtronic ICD implantation  - device and leads are MRI compatible.  Generator model number is [id number], pacer lead [id number], ICD lead 239715.   3. Reported history of pulmonary hypertension  4. Reported history of nonischemic cardiomyopathy  - currently with low normal left ventricular function  5. Hypertension  6. Family history of coronary artery disease, father with history  7. Mild aortic insufficiency with mild aortic root enlargement, typical for tetralogy    Since his last clinic visit he reports doing relatively well.  He had a cardiac CT which was evaluated for self expanding pulmonary valve replacement.  He was thought to be a candidate for self expanding pulmonary valve technology and thus he presents today for pre cath consultation.  He has no complaints referable to the cardiovascular system.    REVIEW OF SYSTEMS:     GENERAL: No fever, chills, fatigability or weight loss.  SKIN: No rashes, itching or changes in color or texture of skin.  EYES: Visual acuity fine. No photophobia, ocular pain or diplopia.  EARS: Denies ear pain, discharge or vertigo.  MOUTH & THROAT: No  hoarseness or change in voice. No excessive gum bleeding.  CHEST: Denies CHRISTIANSON, cyanosis, wheezing, cough and sputum production.  CARDIOVASCULAR: Denies chest pain, PND, orthopnea or reduced exercise tolerance.  ABDOMEN: Appetite fine. No weight loss. Denies diarrhea, abdominal pain, hematemesis or blood in stool.  PERIPHERAL VASCULAR: No claudication or cyanosis.  MUSCULOSKELETAL: No joint stiffness or swelling. Denies back pain.  NEUROLOGIC: No history of seizures, paralysis, alteration of gait or coordination.    PAST MEDICAL HISTORY:   Past Medical History:   Diagnosis Date    Fracture, tibia and fibula     left History of    Palpitations     Recurrent ventricular tachycardia            FAMILY HISTORY:   Family History   Problem Relation Age of Onset    Diabetes Mother     Heart attack Father     No Known Problems Sister     No Known Problems Sister     No Known Problems Sister          SOCIAL HISTORY:   Social History     Socioeconomic History    Marital status:    Tobacco Use    Smoking status: Never     Passive exposure: Never    Smokeless tobacco: Current     Types: Snuff, Chew       ALLERGIES:  Review of patient's allergies indicates:  No Known Allergies    MEDICATIONS:    Current Outpatient Medications:     ALPRAZolam (XANAX) 0.5 MG tablet, Take 0.5 mg by mouth 3 (three) times daily. Take evening before procedure, Disp: , Rfl:     amiodarone (PACERONE) 200 MG Tab, Take 200 mg by mouth once daily. Starting on Tuesday 04/18/2023 pt decreases from twice a day to once a day, Disp: , Rfl:     carvediloL (COREG) 12.5 MG tablet, Take 1 tablet (12.5 mg total) by mouth 2 (two) times daily with meals., Disp: 60 tablet, Rfl: 11    cetirizine (ZYRTEC) 10 MG tablet, Take 10 mg by mouth daily as needed., Disp: , Rfl:     furosemide (LASIX) 20 MG tablet, Daily as needed for weight gain 2-3 lb in 24 hours, shortness of breath, swelling (Patient taking differently: Take 20 mg by mouth daily as needed. Daily as needed  "for weight gain 2-3 lb in 24 hours, shortness of breath, swelling), Disp: 30 tablet, Rfl: 11    rivaroxaban (XARELTO) 15 mg Tab, Take 1 tablet (15 mg total) by mouth daily with dinner or evening meal., Disp: 30 tablet, Rfl: 1    sacubitriL-valsartan (ENTRESTO) 49-51 mg per tablet, Take 1 tablet by mouth 2 (two) times daily., Disp: 60 tablet, Rfl: 10    spironolactone (ALDACTONE) 25 MG tablet, Take 1 tablet (25 mg total) by mouth 2 (two) times daily., Disp: 60 tablet, Rfl: 11      PHYSICAL EXAM:   Vitals:    09/27/23 1314   BP: (!) 150/79   BP Location: Left arm   Patient Position: Sitting   Pulse: 60   SpO2: 97%   Weight: 71.5 kg (157 lb 10.1 oz)   Height: 5' 9.02" (1.753 m)         GENERAL: Awake, well-developed well-nourished, no apparent distress. Non-cyanotic.  HEENT: Mucous membranes moist and pink, normocephalic atraumatic, no cranial or carotid bruits, sclera anicteric, EOMI  NECK: No jugular venous distention, no thyromegaly, no lymphadenopathy  CHEST: Good air movement, clear to auscultation bilaterally  CARDIOVASCULAR: Quiet precordium, regular rate and rhythm, S1 unable to appreciate S2 splitting, no rubs or gallops.  There is a 2/6 systolic ejection murmur followed by a prominent diastolic murmur.  ABDOMEN: Soft, nontender nondistended, no hepatosplenomegaly, no aortic bruits  EXTREMITIES: Warm well perfused, 2+ radial/femoral/pedal pulses, capillary refill 2 seconds, no clubbing, cyanosis, or edema  NEURO: Alert and oriented, cooperative with exam, face symmetric, moves all extremities well    STUDIES:  None    ASSESSMENT:  Encounter Diagnoses   Name Primary?    Adult congenital heart disease Yes    Congenital stenosis of left pulmonary artery     Tetralogy of Fallot s/p repair     Ventricular tachycardia     Nonrheumatic pulmonary valve stenosis with insufficiency      PLAN:     1) I reviewed my physical exam findings in the prior MRI findings with Pat and his wife.  His MRI demonstrates mild pulmonary " stenosis and free pulmonary insufficiency with a dilated right ventricle.  He also has notable proximal LPA stenosis.  His CT imaging was also reviewed.  His CT imaging suggest he is a good candidate for self expanding pulmonary valve technology.  However, I explained some of the limitations of this imaging, and he still might not be deemed a candidate at the time of catheterization for self expanding pulmonary valve technology.  We discussed the risks and benefits of pulmonary valve replacement and he elected to move forward with the procedure.    2) Cardiac catheterization with possible [place] valve implant in the a.m.    Time Spent: 30 (min) with over 50% in direct patient and family consultation.      The patient's doctor will be notified via Fax    I hope this brings you up-to-date on Cristobal Kidd III  Please contact me with any questions or concerns.    Sheree Chen MD  Pediatric Cardiology  Interventional Cardiology  [address] [place] [address]  [phone]

## 2023-09-27 NOTE — H&P (VIEW-ONLY)
Thank you for referring your patient Cristobal Kidd III to the cardiology clinic for consultation. The patient is accompanied by his  wife . Please review my findings below.    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  As you know, he is a 53-year-old male with the following cardiac diagnoses:  1. History of tetralogy of Fallot status post 2 surgeries at [facility], 1st at about 3 years of age, 2nd at about 7 years of age.  Both performed via median sternotomy.    - mild pulmonary stenosis and likely moderate pulmonary insufficiency  - severe proximal LPA narrowing on CTA 8/16/22 at [facility].  2. History of ventricular tachycardia status post Medtronic ICD implantation  - device and leads are MRI compatible.  Generator model number is [id number], pacer lead [id number], ICD lead [id number].   3. Reported history of pulmonary hypertension  4. Reported history of nonischemic cardiomyopathy  - currently with low normal left ventricular function  5. Hypertension  6. Family history of coronary artery disease, father with history  7. Mild aortic insufficiency with mild aortic root enlargement, typical for tetralogy    Since his last clinic visit he reports doing relatively well.  He had a cardiac CT which was evaluated for self expanding pulmonary valve replacement.  He was thought to be a candidate for self expanding pulmonary valve technology and thus he presents today for pre cath consultation.  He has no complaints referable to the cardiovascular system.    REVIEW OF SYSTEMS:     GENERAL: No fever, chills, fatigability or weight loss.  SKIN: No rashes, itching or changes in color or texture of skin.  EYES: Visual acuity fine. No photophobia, ocular pain or diplopia.  EARS: Denies ear pain, discharge or vertigo.  MOUTH & THROAT: No  hoarseness or change in voice. No excessive gum bleeding.  CHEST: Denies CHRISTIANSON, cyanosis, wheezing, cough and sputum production.  CARDIOVASCULAR: Denies chest pain, PND, orthopnea or reduced exercise tolerance.  ABDOMEN: Appetite fine. No weight loss. Denies diarrhea, abdominal pain, hematemesis or blood in stool.  PERIPHERAL VASCULAR: No claudication or cyanosis.  MUSCULOSKELETAL: No joint stiffness or swelling. Denies back pain.  NEUROLOGIC: No history of seizures, paralysis, alteration of gait or coordination.    PAST MEDICAL HISTORY:   Past Medical History:   Diagnosis Date    Fracture, tibia and fibula     left History of    Palpitations     Recurrent ventricular tachycardia            FAMILY HISTORY:   Family History   Problem Relation Age of Onset    Diabetes Mother     Heart attack Father     No Known Problems Sister     No Known Problems Sister     No Known Problems Sister          SOCIAL HISTORY:   Social History     Socioeconomic History    Marital status:    Tobacco Use    Smoking status: Never     Passive exposure: Never    Smokeless tobacco: Current     Types: Snuff, Chew       ALLERGIES:  Review of patient's allergies indicates:  No Known Allergies    MEDICATIONS:    Current Outpatient Medications:     ALPRAZolam (XANAX) 0.5 MG tablet, Take 0.5 mg by mouth 3 (three) times daily. Take evening before procedure, Disp: , Rfl:     amiodarone (PACERONE) 200 MG Tab, Take 200 mg by mouth once daily. Starting on Tuesday 04/18/2023 pt decreases from twice a day to once a day, Disp: , Rfl:     carvediloL (COREG) 12.5 MG tablet, Take 1 tablet (12.5 mg total) by mouth 2 (two) times daily with meals., Disp: 60 tablet, Rfl: 11    cetirizine (ZYRTEC) 10 MG tablet, Take 10 mg by mouth daily as needed., Disp: , Rfl:     furosemide (LASIX) 20 MG tablet, Daily as needed for weight gain 2-3 lb in 24 hours, shortness of breath, swelling (Patient taking differently: Take 20 mg by mouth daily as needed. Daily as needed  "for weight gain 2-3 lb in 24 hours, shortness of breath, swelling), Disp: 30 tablet, Rfl: 11    rivaroxaban (XARELTO) 15 mg Tab, Take 1 tablet (15 mg total) by mouth daily with dinner or evening meal., Disp: 30 tablet, Rfl: 1    sacubitriL-valsartan (ENTRESTO) 49-51 mg per tablet, Take 1 tablet by mouth 2 (two) times daily., Disp: 60 tablet, Rfl: 10    spironolactone (ALDACTONE) 25 MG tablet, Take 1 tablet (25 mg total) by mouth 2 (two) times daily., Disp: 60 tablet, Rfl: 11      PHYSICAL EXAM:   Vitals:    09/27/23 1314   BP: (!) 150/79   BP Location: Left arm   Patient Position: Sitting   Pulse: 60   SpO2: 97%   Weight: 71.5 kg (157 lb 10.1 oz)   Height: 5' 9.02" (1.753 m)         GENERAL: Awake, well-developed well-nourished, no apparent distress. Non-cyanotic.  HEENT: Mucous membranes moist and pink, normocephalic atraumatic, no cranial or carotid bruits, sclera anicteric, EOMI  NECK: No jugular venous distention, no thyromegaly, no lymphadenopathy  CHEST: Good air movement, clear to auscultation bilaterally  CARDIOVASCULAR: Quiet precordium, regular rate and rhythm, S1 unable to appreciate S2 splitting, no rubs or gallops.  There is a 2/6 systolic ejection murmur followed by a prominent diastolic murmur.  ABDOMEN: Soft, nontender nondistended, no hepatosplenomegaly, no aortic bruits  EXTREMITIES: Warm well perfused, 2+ radial/femoral/pedal pulses, capillary refill 2 seconds, no clubbing, cyanosis, or edema  NEURO: Alert and oriented, cooperative with exam, face symmetric, moves all extremities well    STUDIES:  None    ASSESSMENT:  Encounter Diagnoses   Name Primary?    Adult congenital heart disease Yes    Congenital stenosis of left pulmonary artery     Tetralogy of Fallot s/p repair     Ventricular tachycardia     Nonrheumatic pulmonary valve stenosis with insufficiency      PLAN:     1) I reviewed my physical exam findings in the prior MRI findings with Pat and his wife.  His MRI demonstrates mild pulmonary " stenosis and free pulmonary insufficiency with a dilated right ventricle.  He also has notable proximal LPA stenosis.  His CT imaging was also reviewed.  His CT imaging suggest he is a good candidate for self expanding pulmonary valve technology.  However, I explained some of the limitations of this imaging, and he still might not be deemed a candidate at the time of catheterization for self expanding pulmonary valve technology.  We discussed the risks and benefits of pulmonary valve replacement and he elected to move forward with the procedure.    2) Cardiac catheterization with possible [place] valve implant in the a.m.    Time Spent: 30 (min) with over 50% in direct patient and family consultation.      The patient's doctor will be notified via Fax    I hope this brings you up-to-date on Cristobal Kidd III  Please contact me with any questions or concerns.    Sheree Chen MD  Pediatric Cardiology  Interventional Cardiology  [address] [place] [address]  [phone]

## 2023-09-28 ENCOUNTER — ANESTHESIA (OUTPATIENT)
Dept: MEDSURG UNIT | Facility: HOSPITAL | Age: 53
DRG: 267 | End: 2023-09-28
Payer: COMMERCIAL

## 2023-09-28 ENCOUNTER — HOSPITAL ENCOUNTER (INPATIENT)
Facility: HOSPITAL | Age: 53
LOS: 1 days | Discharge: HOME | DRG: 267 | End: 2023-09-29
Attending: PEDIATRICS | Admitting: PEDIATRICS
Payer: COMMERCIAL

## 2023-09-28 ENCOUNTER — ANESTHESIA EVENT (OUTPATIENT)
Dept: MEDSURG UNIT | Facility: HOSPITAL | Age: 53
DRG: 267 | End: 2023-09-28
Payer: COMMERCIAL

## 2023-09-28 DIAGNOSIS — Q21.3 TETRALOGY OF FALLOT: ICD-10-CM

## 2023-09-28 DIAGNOSIS — Z87.74 TETRALOGY OF FALLOT S/P REPAIR: Primary | ICD-10-CM

## 2023-09-28 DIAGNOSIS — I37.1 NONRHEUMATIC PULMONARY VALVE INSUFFICIENCY: ICD-10-CM

## 2023-09-28 DIAGNOSIS — Q25.6 CONGENITAL STENOSIS OF LEFT PULMONARY ARTERY: ICD-10-CM

## 2023-09-28 DIAGNOSIS — Q21.3 TOF (TETRALOGY OF FALLOT): ICD-10-CM

## 2023-09-28 DIAGNOSIS — Q24.9 ADULT CONGENITAL HEART DISEASE: ICD-10-CM

## 2023-09-28 LAB
ABO + RH BLD: NORMAL
ANION GAP SERPL CALC-SCNC: 8 MMOL/L (ref 8–16)
BASOPHILS # BLD AUTO: 0.05 K/UL (ref 0–0.2)
BASOPHILS NFR BLD: 0.7 % (ref 0–1.9)
BLD GP AB SCN CELLS X3 SERPL QL: NORMAL
BUN SERPL-MCNC: 12 MG/DL (ref 6–20)
CALCIUM SERPL-MCNC: 9.8 MG/DL (ref 8.7–10.5)
CHLORIDE SERPL-SCNC: 110 MMOL/L (ref 95–110)
CO2 SERPL-SCNC: 24 MMOL/L (ref 23–29)
CREAT SERPL-MCNC: 1 MG/DL (ref 0.5–1.4)
DIFFERENTIAL METHOD: ABNORMAL
EOSINOPHIL # BLD AUTO: 0.5 K/UL (ref 0–0.5)
EOSINOPHIL NFR BLD: 6.1 % (ref 0–8)
ERYTHROCYTE [DISTWIDTH] IN BLOOD BY AUTOMATED COUNT: 14 % (ref 11.5–14.5)
EST. GFR  (NO RACE VARIABLE): >60 ML/MIN/1.73 M^2
GLUCOSE SERPL-MCNC: 109 MG/DL (ref 70–110)
GLUCOSE SERPL-MCNC: 116 MG/DL (ref 70–110)
HCO3 UR-SCNC: 20.1 MMOL/L
HCT VFR BLD AUTO: 32.2 % (ref 40–54)
HCT VFR BLD CALC: 30 %PCV (ref 36–54)
HGB BLD-MCNC: 10.9 G/DL (ref 14–18)
IMM GRANULOCYTES # BLD AUTO: 0.02 K/UL (ref 0–0.04)
IMM GRANULOCYTES NFR BLD AUTO: 0.3 % (ref 0–0.5)
LYMPHOCYTES # BLD AUTO: 2.2 K/UL (ref 1–4.8)
LYMPHOCYTES NFR BLD: 28.6 % (ref 18–48)
MCH RBC QN AUTO: 32 PG (ref 27–31)
MCHC RBC AUTO-ENTMCNC: 33.9 G/DL (ref 32–36)
MCV RBC AUTO: 94 FL (ref 82–98)
MONOCYTES # BLD AUTO: 0.6 K/UL (ref 0.3–1)
MONOCYTES NFR BLD: 8 % (ref 4–15)
NEUTROPHILS # BLD AUTO: 4.3 K/UL (ref 1.8–7.7)
NEUTROPHILS NFR BLD: 56.3 % (ref 38–73)
NRBC BLD-RTO: 0 /100 WBC
PCO2 BLDA: 31.2 MMHG (ref 35–45)
PH SMN: 7.42 [PH] (ref 7.35–7.45)
PLATELET # BLD AUTO: 218 K/UL (ref 150–450)
PMV BLD AUTO: 10.8 FL (ref 9.2–12.9)
PO2 BLDA: 78 MMHG (ref 80–100)
POC ACTIVATED CLOTTING TIME K: 143 SEC (ref 74–137)
POC ACTIVATED CLOTTING TIME K: 197 SEC (ref 74–137)
POC ACTIVATED CLOTTING TIME K: 215 SEC (ref 74–137)
POC ACTIVATED CLOTTING TIME K: 227 SEC (ref 74–137)
POC BE: -4 MMOL/L
POC IONIZED CALCIUM: 1.34 MMOL/L (ref 1.06–1.42)
POC SATURATED O2: 96 % (ref 95–100)
POC TCO2: 21 MMOL/L (ref 23–27)
POTASSIUM BLD-SCNC: 3.5 MMOL/L (ref 3.5–5.1)
POTASSIUM SERPL-SCNC: 3.9 MMOL/L (ref 3.5–5.1)
RBC # BLD AUTO: 3.41 M/UL (ref 4.6–6.2)
SAMPLE: ABNORMAL
SODIUM BLD-SCNC: 143 MMOL/L (ref 136–145)
SODIUM SERPL-SCNC: 142 MMOL/L (ref 136–145)
SPECIMEN OUTDATE: NORMAL
WBC # BLD AUTO: 7.54 K/UL (ref 3.9–12.7)

## 2023-09-28 PROCEDURE — 93596 R&L HRT CATH CHD NML NT CNJ: CPT | Mod: 26,22,82, | Performed by: PEDIATRICS

## 2023-09-28 PROCEDURE — 33477 IMPLANT TCAT PULM VLV PERQ: CPT | Performed by: PEDIATRICS

## 2023-09-28 PROCEDURE — 86920 COMPATIBILITY TEST SPIN: CPT | Performed by: PEDIATRICS

## 2023-09-28 PROCEDURE — 84132 ASSAY OF SERUM POTASSIUM: CPT | Performed by: PEDIATRICS

## 2023-09-28 PROCEDURE — 33477 PR IMPLANT TRASNCATH PULM VALVE, PERQ: ICD-10-PCS | Mod: 22,82,, | Performed by: PEDIATRICS

## 2023-09-28 PROCEDURE — C1751 CATH, INF, PER/CENT/MIDLINE: HCPCS | Performed by: PEDIATRICS

## 2023-09-28 PROCEDURE — 93596 PR RT & LT HEART CATH W/IMG GUID, NORMAL NATIVE CONNECT: ICD-10-PCS | Mod: 26,22,82, | Performed by: PEDIATRICS

## 2023-09-28 PROCEDURE — 25000003 PHARM REV CODE 250: Performed by: NURSE ANESTHETIST, CERTIFIED REGISTERED

## 2023-09-28 PROCEDURE — D9220A PRA ANESTHESIA: Mod: CRNA,,, | Performed by: NURSE ANESTHETIST, CERTIFIED REGISTERED

## 2023-09-28 PROCEDURE — 63600175 PHARM REV CODE 636 W HCPCS: Performed by: ANESTHESIOLOGY

## 2023-09-28 PROCEDURE — 93568 NJX CAR CTH NSLC P-ART ANGRP: CPT | Mod: 59,,, | Performed by: PEDIATRICS

## 2023-09-28 PROCEDURE — 86901 BLOOD TYPING SEROLOGIC RH(D): CPT | Performed by: PEDIATRICS

## 2023-09-28 PROCEDURE — 93568 NJX CAR CTH NSLC P-ART ANGRP: CPT | Mod: 82,,, | Performed by: PEDIATRICS

## 2023-09-28 PROCEDURE — 33477 IMPLANT TCAT PULM VLV PERQ: CPT | Mod: 22,82,, | Performed by: PEDIATRICS

## 2023-09-28 PROCEDURE — 33477 PR IMPLANT TRASNCATH PULM VALVE, PERQ: ICD-10-PCS | Mod: 22,,, | Performed by: PEDIATRICS

## 2023-09-28 PROCEDURE — C1726 CATH, BAL DIL, NON-VASCULAR: HCPCS | Performed by: PEDIATRICS

## 2023-09-28 PROCEDURE — D9220A PRA ANESTHESIA: ICD-10-PCS | Mod: ANES,,, | Performed by: ANESTHESIOLOGY

## 2023-09-28 PROCEDURE — D9220A PRA ANESTHESIA: ICD-10-PCS | Mod: CRNA,,, | Performed by: NURSE ANESTHETIST, CERTIFIED REGISTERED

## 2023-09-28 PROCEDURE — 82947 ASSAY GLUCOSE BLOOD QUANT: CPT | Performed by: PEDIATRICS

## 2023-09-28 PROCEDURE — 93596 PR RT & LT HEART CATH W/IMG GUID, NORMAL NATIVE CONNECT: ICD-10-PCS | Mod: 26,59,22, | Performed by: PEDIATRICS

## 2023-09-28 PROCEDURE — 25000003 PHARM REV CODE 250: Performed by: PEDIATRICS

## 2023-09-28 PROCEDURE — 83605 ASSAY OF LACTIC ACID: CPT | Performed by: PEDIATRICS

## 2023-09-28 PROCEDURE — 93568 PR INJECT PULMONARY ANGIOGRAPHY DURING HEART CATH: ICD-10-PCS | Mod: 82,,, | Performed by: PEDIATRICS

## 2023-09-28 PROCEDURE — 63600175 PHARM REV CODE 636 W HCPCS: Performed by: NURSE ANESTHETIST, CERTIFIED REGISTERED

## 2023-09-28 PROCEDURE — 93568 PR INJECT PULMONARY ANGIOGRAPHY DURING HEART CATH: ICD-10-PCS | Mod: 59,,, | Performed by: PEDIATRICS

## 2023-09-28 PROCEDURE — 85347 COAGULATION TIME ACTIVATED: CPT | Performed by: PEDIATRICS

## 2023-09-28 PROCEDURE — 25000003 PHARM REV CODE 250: Performed by: ANESTHESIOLOGY

## 2023-09-28 PROCEDURE — 27800903 OPTIME MED/SURG SUP & DEVICES OTHER IMPLANTS: Performed by: PEDIATRICS

## 2023-09-28 PROCEDURE — 20600001 HC STEP DOWN PRIVATE ROOM

## 2023-09-28 PROCEDURE — 37000008 HC ANESTHESIA 1ST 15 MINUTES: Performed by: PEDIATRICS

## 2023-09-28 PROCEDURE — 85025 COMPLETE CBC W/AUTO DIFF WBC: CPT | Performed by: PEDIATRICS

## 2023-09-28 PROCEDURE — 93596 R&L HRT CATH CHD NML NT CNJ: CPT | Performed by: PEDIATRICS

## 2023-09-28 PROCEDURE — 82805 BLOOD GASES W/O2 SATURATION: CPT | Performed by: PEDIATRICS

## 2023-09-28 PROCEDURE — C1894 INTRO/SHEATH, NON-LASER: HCPCS | Performed by: PEDIATRICS

## 2023-09-28 PROCEDURE — 80048 BASIC METABOLIC PNL TOTAL CA: CPT | Performed by: PEDIATRICS

## 2023-09-28 PROCEDURE — 82330 ASSAY OF CALCIUM: CPT | Performed by: PEDIATRICS

## 2023-09-28 PROCEDURE — 37000009 HC ANESTHESIA EA ADD 15 MINS: Performed by: PEDIATRICS

## 2023-09-28 PROCEDURE — 36415 COLL VENOUS BLD VENIPUNCTURE: CPT | Performed by: PEDIATRICS

## 2023-09-28 PROCEDURE — 93596 R&L HRT CATH CHD NML NT CNJ: CPT | Mod: 26,59,22, | Performed by: PEDIATRICS

## 2023-09-28 PROCEDURE — 27201423 OPTIME MED/SURG SUP & DEVICES STERILE SUPPLY: Performed by: PEDIATRICS

## 2023-09-28 PROCEDURE — 33477 IMPLANT TCAT PULM VLV PERQ: CPT | Mod: 22,,, | Performed by: PEDIATRICS

## 2023-09-28 PROCEDURE — 63600175 PHARM REV CODE 636 W HCPCS: Performed by: PEDIATRICS

## 2023-09-28 PROCEDURE — 93568 NJX CAR CTH NSLC P-ART ANGRP: CPT | Performed by: PEDIATRICS

## 2023-09-28 PROCEDURE — C1769 GUIDE WIRE: HCPCS | Performed by: PEDIATRICS

## 2023-09-28 PROCEDURE — D9220A PRA ANESTHESIA: Mod: ANES,,, | Performed by: ANESTHESIOLOGY

## 2023-09-28 DEVICE — HARMONY" DELIVERY CATHETER SYSTEM
Type: IMPLANTABLE DEVICE | Site: HEART | Status: FUNCTIONAL
Brand: HARMONY™

## 2023-09-28 RX ORDER — LIDOCAINE HYDROCHLORIDE 20 MG/ML
INJECTION INTRAVENOUS
Status: DISCONTINUED | OUTPATIENT
Start: 2023-09-28 | End: 2023-09-28

## 2023-09-28 RX ORDER — HEPARIN SODIUM 1000 [USP'U]/ML
INJECTION, SOLUTION INTRAVENOUS; SUBCUTANEOUS
Status: DISCONTINUED | OUTPATIENT
Start: 2023-09-28 | End: 2023-09-28

## 2023-09-28 RX ORDER — OXYCODONE HYDROCHLORIDE 10 MG/1
10 TABLET ORAL EVERY 4 HOURS PRN
Status: DISCONTINUED | OUTPATIENT
Start: 2023-09-28 | End: 2023-09-29 | Stop reason: HOSPADM

## 2023-09-28 RX ORDER — CARVEDILOL 3.12 MG/1
12.5 TABLET ORAL 2 TIMES DAILY WITH MEALS
Status: DISCONTINUED | OUTPATIENT
Start: 2023-09-28 | End: 2023-09-28

## 2023-09-28 RX ORDER — NAPROXEN SODIUM 220 MG/1
81 TABLET, FILM COATED ORAL DAILY
Status: DISCONTINUED | OUTPATIENT
Start: 2023-09-28 | End: 2023-09-29 | Stop reason: HOSPADM

## 2023-09-28 RX ORDER — DEXMEDETOMIDINE HYDROCHLORIDE 4 UG/ML
0-2 INJECTION, SOLUTION INTRAVENOUS CONTINUOUS
Status: DISCONTINUED | OUTPATIENT
Start: 2023-09-28 | End: 2023-09-29 | Stop reason: HOSPADM

## 2023-09-28 RX ORDER — CARVEDILOL 12.5 MG/1
12.5 TABLET ORAL 2 TIMES DAILY
Status: DISCONTINUED | OUTPATIENT
Start: 2023-09-28 | End: 2023-09-29 | Stop reason: HOSPADM

## 2023-09-28 RX ORDER — DEXMEDETOMIDINE HYDROCHLORIDE 100 UG/ML
INJECTION, SOLUTION INTRAVENOUS
Status: DISCONTINUED | OUTPATIENT
Start: 2023-09-28 | End: 2023-09-28

## 2023-09-28 RX ORDER — ACETAMINOPHEN 325 MG/1
650 TABLET ORAL EVERY 4 HOURS PRN
Status: DISCONTINUED | OUTPATIENT
Start: 2023-09-28 | End: 2023-09-29 | Stop reason: HOSPADM

## 2023-09-28 RX ORDER — PHENYLEPHRINE HCL IN 0.9% NACL 1 MG/10 ML
SYRINGE (ML) INTRAVENOUS
Status: DISCONTINUED | OUTPATIENT
Start: 2023-09-28 | End: 2023-09-28

## 2023-09-28 RX ORDER — SODIUM CHLORIDE 0.9 % (FLUSH) 0.9 %
10 SYRINGE (ML) INJECTION
Status: DISCONTINUED | OUTPATIENT
Start: 2023-09-28 | End: 2023-09-29 | Stop reason: HOSPADM

## 2023-09-28 RX ORDER — ONDANSETRON 2 MG/ML
4 INJECTION INTRAMUSCULAR; INTRAVENOUS DAILY PRN
Status: DISCONTINUED | OUTPATIENT
Start: 2023-09-28 | End: 2023-09-29 | Stop reason: HOSPADM

## 2023-09-28 RX ORDER — AMIODARONE HYDROCHLORIDE 200 MG/1
200 TABLET ORAL DAILY
Status: DISCONTINUED | OUTPATIENT
Start: 2023-09-29 | End: 2023-09-29 | Stop reason: HOSPADM

## 2023-09-28 RX ORDER — EPHEDRINE SULFATE 50 MG/ML
INJECTION, SOLUTION INTRAVENOUS
Status: DISCONTINUED | OUTPATIENT
Start: 2023-09-28 | End: 2023-09-28

## 2023-09-28 RX ORDER — SPIRONOLACTONE 25 MG/1
25 TABLET ORAL 2 TIMES DAILY
Status: DISCONTINUED | OUTPATIENT
Start: 2023-09-28 | End: 2023-09-29 | Stop reason: HOSPADM

## 2023-09-28 RX ORDER — FENTANYL CITRATE 50 UG/ML
25 INJECTION, SOLUTION INTRAMUSCULAR; INTRAVENOUS EVERY 5 MIN PRN
Status: DISCONTINUED | OUTPATIENT
Start: 2023-09-28 | End: 2023-09-29 | Stop reason: HOSPADM

## 2023-09-28 RX ORDER — ONDANSETRON 2 MG/ML
INJECTION INTRAMUSCULAR; INTRAVENOUS
Status: DISCONTINUED | OUTPATIENT
Start: 2023-09-28 | End: 2023-09-28

## 2023-09-28 RX ORDER — EPINEPHRINE 1 MG/ML
INJECTION, SOLUTION, CONCENTRATE INTRAVENOUS
Status: DISCONTINUED | OUTPATIENT
Start: 2023-09-28 | End: 2023-09-28

## 2023-09-28 RX ORDER — ACETAMINOPHEN 10 MG/ML
1000 INJECTION, SOLUTION INTRAVENOUS ONCE
Status: COMPLETED | OUTPATIENT
Start: 2023-09-28 | End: 2023-09-28

## 2023-09-28 RX ORDER — FENTANYL CITRATE 50 UG/ML
INJECTION, SOLUTION INTRAMUSCULAR; INTRAVENOUS
Status: DISCONTINUED | OUTPATIENT
Start: 2023-09-28 | End: 2023-09-28

## 2023-09-28 RX ORDER — CALCIUM CHLORIDE INJECTION 100 MG/ML
INJECTION, SOLUTION INTRAVENOUS
Status: DISCONTINUED | OUTPATIENT
Start: 2023-09-28 | End: 2023-09-28

## 2023-09-28 RX ORDER — PROPOFOL 10 MG/ML
VIAL (ML) INTRAVENOUS
Status: DISCONTINUED | OUTPATIENT
Start: 2023-09-28 | End: 2023-09-28

## 2023-09-28 RX ORDER — CALCIUM GLUCONATE 98 MG/ML
INJECTION, SOLUTION INTRAVENOUS
Status: DISCONTINUED | OUTPATIENT
Start: 2023-09-28 | End: 2023-09-28

## 2023-09-28 RX ORDER — MIDAZOLAM HYDROCHLORIDE 1 MG/ML
INJECTION, SOLUTION INTRAMUSCULAR; INTRAVENOUS
Status: DISCONTINUED | OUTPATIENT
Start: 2023-09-28 | End: 2023-09-28

## 2023-09-28 RX ORDER — MORPHINE SULFATE 2 MG/ML
2 INJECTION, SOLUTION INTRAMUSCULAR; INTRAVENOUS
Status: DISCONTINUED | OUTPATIENT
Start: 2023-09-28 | End: 2023-09-29 | Stop reason: HOSPADM

## 2023-09-28 RX ORDER — CEFAZOLIN SODIUM 1 G/3ML
INJECTION, POWDER, FOR SOLUTION INTRAMUSCULAR; INTRAVENOUS
Status: DISCONTINUED | OUTPATIENT
Start: 2023-09-28 | End: 2023-09-28

## 2023-09-28 RX ORDER — CETIRIZINE HYDROCHLORIDE 10 MG/1
10 TABLET ORAL DAILY PRN
Status: DISCONTINUED | OUTPATIENT
Start: 2023-09-28 | End: 2023-09-29 | Stop reason: HOSPADM

## 2023-09-28 RX ORDER — ROCURONIUM BROMIDE 10 MG/ML
INJECTION, SOLUTION INTRAVENOUS
Status: DISCONTINUED | OUTPATIENT
Start: 2023-09-28 | End: 2023-09-28

## 2023-09-28 RX ORDER — FUROSEMIDE 20 MG/1
20 TABLET ORAL DAILY
Status: DISCONTINUED | OUTPATIENT
Start: 2023-09-28 | End: 2023-09-29 | Stop reason: HOSPADM

## 2023-09-28 RX ORDER — MIDAZOLAM HYDROCHLORIDE 1 MG/ML
2 INJECTION INTRAMUSCULAR; INTRAVENOUS ONCE
Status: DISCONTINUED | OUTPATIENT
Start: 2023-09-28 | End: 2023-09-29 | Stop reason: HOSPADM

## 2023-09-28 RX ORDER — CLOPIDOGREL BISULFATE 75 MG/1
75 TABLET ORAL DAILY
Status: DISCONTINUED | OUTPATIENT
Start: 2023-09-28 | End: 2023-09-29 | Stop reason: HOSPADM

## 2023-09-28 RX ORDER — DEXAMETHASONE SODIUM PHOSPHATE 4 MG/ML
INJECTION, SOLUTION INTRA-ARTICULAR; INTRALESIONAL; INTRAMUSCULAR; INTRAVENOUS; SOFT TISSUE
Status: DISCONTINUED | OUTPATIENT
Start: 2023-09-28 | End: 2023-09-28

## 2023-09-28 RX ORDER — PROTAMINE SULFATE 10 MG/ML
INJECTION, SOLUTION INTRAVENOUS
Status: DISCONTINUED | OUTPATIENT
Start: 2023-09-28 | End: 2023-09-28

## 2023-09-28 RX ADMIN — CEFAZOLIN 1787.5 MG: 330 INJECTION, POWDER, FOR SOLUTION INTRAMUSCULAR; INTRAVENOUS at 08:09

## 2023-09-28 RX ADMIN — SPIRONOLACTONE 25 MG: 25 TABLET ORAL at 10:09

## 2023-09-28 RX ADMIN — DEXAMETHASONE SODIUM PHOSPHATE 4 MG: 4 INJECTION INTRA-ARTICULAR; INTRALESIONAL; INTRAMUSCULAR; INTRAVENOUS; SOFT TISSUE at 09:09

## 2023-09-28 RX ADMIN — FENTANYL CITRATE 25 MCG: 50 INJECTION, SOLUTION INTRAMUSCULAR; INTRAVENOUS at 08:09

## 2023-09-28 RX ADMIN — SODIUM CHLORIDE: 0.9 INJECTION, SOLUTION INTRAVENOUS at 07:09

## 2023-09-28 RX ADMIN — HEPARIN SODIUM 4000 UNITS: 1000 INJECTION, SOLUTION INTRAVENOUS; SUBCUTANEOUS at 09:09

## 2023-09-28 RX ADMIN — CALCIUM CHLORIDE INJECTION 500 MG: 100 INJECTION, SOLUTION INTRAVENOUS at 08:09

## 2023-09-28 RX ADMIN — PROPOFOL 200 MG: 10 INJECTION, EMULSION INTRAVENOUS at 08:09

## 2023-09-28 RX ADMIN — ROCURONIUM BROMIDE 50 MG: 10 INJECTION INTRAVENOUS at 08:09

## 2023-09-28 RX ADMIN — EPHEDRINE SULFATE 10 MG: 50 INJECTION INTRAVENOUS at 09:09

## 2023-09-28 RX ADMIN — CARVEDILOL 12.5 MG: 12.5 TABLET, FILM COATED ORAL at 10:09

## 2023-09-28 RX ADMIN — HEPARIN SODIUM 5000 UNITS: 1000 INJECTION, SOLUTION INTRAVENOUS; SUBCUTANEOUS at 09:09

## 2023-09-28 RX ADMIN — SUGAMMADEX 200 MG: 100 INJECTION, SOLUTION INTRAVENOUS at 11:09

## 2023-09-28 RX ADMIN — ROCURONIUM BROMIDE 10 MG: 10 INJECTION INTRAVENOUS at 09:09

## 2023-09-28 RX ADMIN — EPINEPHRINE 10 MCG: 1 INJECTION, SOLUTION, CONCENTRATE INTRAVENOUS at 08:09

## 2023-09-28 RX ADMIN — SODIUM CHLORIDE, SODIUM GLUCONATE, SODIUM ACETATE, POTASSIUM CHLORIDE, MAGNESIUM CHLORIDE, SODIUM PHOSPHATE, DIBASIC, AND POTASSIUM PHOSPHATE: .53; .5; .37; .037; .03; .012; .00082 INJECTION, SOLUTION INTRAVENOUS at 09:09

## 2023-09-28 RX ADMIN — PROTAMINE SULFATE 40 MG: 10 INJECTION, SOLUTION INTRAVENOUS at 11:09

## 2023-09-28 RX ADMIN — LIDOCAINE HYDROCHLORIDE 100 MG: 20 INJECTION INTRAVENOUS at 08:09

## 2023-09-28 RX ADMIN — EPHEDRINE SULFATE 10 MG: 50 INJECTION INTRAVENOUS at 10:09

## 2023-09-28 RX ADMIN — Medication 150 MCG: at 11:09

## 2023-09-28 RX ADMIN — DEXMEDETOMIDINE 12 MCG: 100 INJECTION, SOLUTION, CONCENTRATE INTRAVENOUS at 11:09

## 2023-09-28 RX ADMIN — Medication 100 MCG: at 09:09

## 2023-09-28 RX ADMIN — FENTANYL CITRATE 25 MCG: 50 INJECTION, SOLUTION INTRAMUSCULAR; INTRAVENOUS at 11:09

## 2023-09-28 RX ADMIN — ROCURONIUM BROMIDE 10 MG: 10 INJECTION INTRAVENOUS at 10:09

## 2023-09-28 RX ADMIN — SACUBITRIL AND VALSARTAN 1 TABLET: 49; 51 TABLET, FILM COATED ORAL at 10:09

## 2023-09-28 RX ADMIN — ASPIRIN 81 MG 81 MG: 81 TABLET ORAL at 06:09

## 2023-09-28 RX ADMIN — ACETAMINOPHEN 1000 MG: 10 INJECTION, SOLUTION INTRAVENOUS at 03:09

## 2023-09-28 RX ADMIN — ONDANSETRON 4 MG: 2 INJECTION INTRAMUSCULAR; INTRAVENOUS at 10:09

## 2023-09-28 RX ADMIN — LIDOCAINE HYDROCHLORIDE 100 MG: 20 INJECTION INTRAVENOUS at 11:09

## 2023-09-28 RX ADMIN — MORPHINE SULFATE 2 MG: 2 INJECTION, SOLUTION INTRAMUSCULAR; INTRAVENOUS at 07:09

## 2023-09-28 RX ADMIN — MIDAZOLAM HYDROCHLORIDE 2 MG: 1 INJECTION, SOLUTION INTRAMUSCULAR; INTRAVENOUS at 07:09

## 2023-09-28 RX ADMIN — HEPARIN SODIUM 3000 UNITS: 1000 INJECTION, SOLUTION INTRAVENOUS; SUBCUTANEOUS at 10:09

## 2023-09-28 NOTE — Clinical Note
The catheter was inserted into the ostium   left main. An angiography was performed of the left coronary arteries. Multiple views were taken. The angiography was performed via hand injection with 8 mL of contrast.  AND REMOVED

## 2023-09-28 NOTE — Clinical Note
The PA catheter was repositioned to the right atrium. Hemodynamics were performed. O2 saturation was measured at 73%.

## 2023-09-28 NOTE — Clinical Note
260 ml of contrast were injected throughout the case. 140 mL of contrast was the total wasted during the case. 400 mL was the total amount used during the case.

## 2023-09-28 NOTE — PROCEDURE NOTE ADDENDUM
Certification of Assistant at Surgery       Surgery Date: 9/28/2023     Participating Surgeons:  Surgeon(s) and Role:     * Sheree Chen III., MD - Primary     * Shane Meyer Jr., MD - Assisting    Procedures:  Procedure(s) (LRB):  Catheterization, Heart, Combined Right and Retrograde Left, for Congenital Heart Defect (N/A)  Replace, Pulmonary, Valve, Pediatric (N/A)  Angiogram, Pulmonary, Pediatric  Valvuloplasty, Pulmonary, Pediatric  Angiogram, Coronary, Pediatric    Assistant Surgeon's Certification of Necessity:  I understand that section 1842 (b) (6) (d) of the Social Security Act generally prohibits Medicare Part B reasonable charge payment for the services of assistants at surgery in teaching hospitals when qualified residents are available to furnish such services. I certify that the services for which payment is claimed were medically necessary, and that no qualified resident was available to perform the services. I further understand that these services are subject to post-payment review by the Medicare carrier.      Shane Meyer MD    09/28/2023  11:43 AM

## 2023-09-28 NOTE — Clinical Note
The PA catheter was repositioned to the right ventricle. Hemodynamics were performed. SIMULANTEOUS PRESSURE W/ MPA

## 2023-09-28 NOTE — Clinical Note
The PA catheter was repositioned to the right pulmonary artery. Hemodynamics were performed. O2 saturation was measured at 71%.

## 2023-09-28 NOTE — ANESTHESIA PREPROCEDURE EVALUATION
09/28/2023  Cristobal Kidd III is a 53 y.o., male.    Cardiac MRI Conclusion: 53 yo with history of tetralogy of Fallot status post repair. ICD with right heart leads in place.      1. Biatrial enlargement.   2. Difficult to assess TR in setting of right heart ICD lead  3. Increased right ventricular volumes. (RVEDV 168 cc/m2 for BSA, RVESV 88 cc/m2 for BSA).  RVEF 48 %. There is artifact from the ICD affecting the superior/anterior RV limiting accuracy.  4. Normal left ventricular volume with LVEF 44 %.  5. Dilated and thin proximal RVOT consistent with patch. There is pulmonary valve tissue in the RVOT. Unrestricted PI with regurgitation fraction of 44%. Increased velocity to 3.1m/sec.   6. Dilated MPA. Dilated RPA with severe discrete proximal LPA stenosis. Discrepant pulmonary blood flow with 88% to RPA.   7. Mildly enlarged aortic root.     Pre-op Assessment    I have reviewed the Patient Summary Reports.     I have reviewed the Nursing Notes. I have reviewed the NPO Status.   I have reviewed the Medications.     Review of Systems  Anesthesia Hx:  No problems with previous Anesthesia  Denies Family Hx of Anesthesia complications.   Denies Personal Hx of Anesthesia complications.   Social:  Social Alcohol Use, Non-Smoker    Cardiovascular:   Pacemaker (AICD  8/2022) Hypertension, well controlled Dysrhythmias  Denies Angina. CHF CHRISTIANSON ECG has been reviewed. 4/2023 EKG AV paced  3/2023 ECHO  EF 55%  PASP 55.2 Cardiovascular Symptoms: Palpitation - frequent Valvular Heart Disease: Congenital Heart Disease Aortic Regurgitation (AR), mild, Pulmonic Stenosis (PS), moderate, Pulmonic Regurgitation (WI), moderate, Tricuspid Regurgitation (TR), moderate   Congenital Heart Disease:  Tetralogy of Fallot (TOF) (surgeries @ 6&10 years old), s/p surgical repair  Cardiomyopathy, Non-Ischemic Cardiomyopathy   Hypertension, Essential Hypertension , Pt in optimal range, systolic < 120 and diastolic < 80, Well Controlled on Rx , Recent typical clinic B/P of 112/70  Disorder of Cardiac Rhythm, Ventricular Tachycardia, recent episode, recurrent, s/p ablation Rx  Disorder of Cardiac Conduction, Intraventricular Conduct Defect, Right Bundle Branch Block(RBBB)        Physical Exam  General: Well nourished, Cooperative, Alert and Oriented    Airway:  Mallampati: III   Mouth Opening: Normal  TM Distance: Normal  Tongue: Normal  Neck ROM: Normal ROM    Dental:  Intact    Chest/Lungs:  Clear to auscultation, Normal Respiratory Rate    Heart:  Rate: Normal  Rhythm: Regular Rhythm  Murmur: Systolic;  Systolic: Ejection Type, Grade III;        Anesthesia Plan  Type of Anesthesia, risks & benefits discussed:    Anesthesia Type: Gen ETT  Intra-op Monitoring Plan: Standard ASA Monitors  Post Op Pain Control Plan: multimodal analgesia and IV/PO Opioids PRN  Induction:  IV  Airway Plan: Direct, Post-Induction  Informed Consent: Informed consent signed with the Patient and all parties understand the risks and agree with anesthesia plan.  All questions answered. Patient consented to blood products? Yes  ASA Score: 3  Day of Surgery Review of History & Physical: H&P Update referred to the surgeon/provider.    Ready For Surgery From Anesthesia Perspective.     .

## 2023-09-28 NOTE — INTERVAL H&P NOTE
The patient has been examined and the H&P has been reviewed:    I concur with the findings and no changes have occurred since H&P was written.    Anesthesia/Surgery risks, benefits and alternative options discussed and understood by patient/family.      Sheree Chen III, MD  Pediatric Cardiology  Interventional Cardiology  [facility] [address] [place] [address]

## 2023-09-28 NOTE — TRANSFER OF CARE
Anesthesia Transfer of Care Note    Patient: Cristobal Kidd III    Procedure(s) Performed: Procedure(s) (LRB):  Catheterization, Heart, Combined Right and Retrograde Left, for Congenital Heart Defect (N/A)  Replace, Pulmonary, Valve, Pediatric (N/A)  Angiogram, Pulmonary, Pediatric  Valvuloplasty, Pulmonary, Pediatric  Angiogram, Coronary, Pediatric    Patient location: PACU    Anesthesia Type: general    Transport from OR: Transported from OR on 6-10 L/min O2 by face mask with adequate spontaneous ventilation    Post pain: adequate analgesia    Post assessment: no apparent anesthetic complications and tolerated procedure well    Post vital signs: stable    Level of consciousness: sedated    Nausea/Vomiting: no nausea/vomiting    Complications: none    Transfer of care protocol was followed      Last vitals:   Visit Vitals  /72 (BP Location: Left arm, Patient Position: Lying)   Pulse 60   Temp 36.7 °C (98.1 °F) (Temporal)   Resp 16   SpO2 98%

## 2023-09-28 NOTE — Clinical Note
The catheter was inserted into the pulmonary artery. An angiography was performed of the PULMONARY ARTERIES. The angiography was performed via power injection. The injected amount was 30 mL contrast at 20 mL/s. The PSI from the power injection was 540.

## 2023-09-28 NOTE — Clinical Note
The catheter was inserted into the ostium   right coronary artery. An angiography was performed of the right coronary arteries. The angiography was performed via hand injection with 4 mL of contrast.  AND REMOVED

## 2023-09-28 NOTE — ANESTHESIA PROCEDURE NOTES
Intubation    Date/Time: 9/28/2023 8:07 AM    Performed by: Ivette Cueva CRNA  Authorized by: Devyn Coreas MD    Intubation:     Induction:  Intravenous    Intubated:  Postinduction    Mask Ventilation:  Easy mask    Attempts:  1    Attempted By:  CRNA    Method of Intubation:  Video laryngoscopy    Blade:  Hernandez 3    Laryngeal View Grade: Grade I - full view of cords      Difficult Airway Encountered?: No      Complications:  None    Airway Device:  Oral endotracheal tube    Airway Device Size:  7.5    Style/Cuff Inflation:  Cuffed    Tube secured:  22    Secured at:  The lips    Placement Verified By:  Capnometry    Complicating Factors:  None    Findings Post-Intubation:  BS equal bilateral

## 2023-09-28 NOTE — OR NURSING
Bleeding to right groin site.. Pressure held for 20 minutes. Quick clot dressing applied. 12:35 bleeding to right groin site. Pressure held for 15 minutes and quick clot dressing applied. MD notified. 12:55 Safeguard dressing applied. 13:05 Bleeding to right groin site. Safe guard removed and pressure held for 60 min. Quick clot dressing applied.14:10 bleeding to right groin site.  Pressure held for 60 min. Continuing to ooze. 1530 MD at bedside. Site cleaned with betadine. Lidocaine injected per MD and suture placed. Gauze and tegaderm dressing applied. Pressure held for 20 minutes. 17:05. Slow ooze to right groin dressing. MD notified. Pressure held for 15 min. Dressing replaced with gauze, tegaderm and tensoplast.  17:45 Hemostasis achieved.

## 2023-09-28 NOTE — Clinical Note
An angiography was performed of the PULMONARY ARTERIES. The angiography was performed via power injection. The injected amount was 30 mL contrast at 20 mL/s. The PSI from the power injection was 600.

## 2023-09-28 NOTE — Clinical Note
The catheter was reinserted into the pulmonary artery. Hemodynamics were performed.  SIMULTANEOUS WITH RV

## 2023-09-28 NOTE — Clinical Note
An angiography was performed of the PULMONARY ARTERIES. The angiography was performed via power injection. The injected amount was 30 mL contrast at 20 mL/s. The PSI from the power injection was 540.

## 2023-09-29 VITALS
RESPIRATION RATE: 14 BRPM | SYSTOLIC BLOOD PRESSURE: 92 MMHG | OXYGEN SATURATION: 94 % | HEIGHT: 69 IN | WEIGHT: 157 LBS | HEART RATE: 63 BPM | BODY MASS INDEX: 23.25 KG/M2 | DIASTOLIC BLOOD PRESSURE: 51 MMHG | TEMPERATURE: 98 F

## 2023-09-29 LAB
AV INDEX (PROSTH): 0.69
AV MEAN GRADIENT: 3 MMHG
AV PEAK GRADIENT: 5 MMHG
AV VELOCITY RATIO: 0.74
BSA FOR ECHO PROCEDURE: 1.86 M2
CV ECHO LV RWT: 0.28 CM
DOP CALC AO PEAK VEL: 1.15 M/S
DOP CALC AO VTI: 25.99 CM
DOP CALC LVOT PEAK VEL: 0.85 M/S
DOP CALC RVOT PEAK VEL: 0.75 M/S
DOP CALC RVOT VTI: 19.15 CM
DOP CALCLVOT PEAK VEL VTI: 18 CM
ECHO LV POSTERIOR WALL: 0.73 CM (ref 0.6–1.1)
FRACTIONAL SHORTENING: 38 % (ref 28–44)
INTERVENTRICULAR SEPTUM: 0.76 CM (ref 0.6–1.1)
LEFT INTERNAL DIMENSION IN SYSTOLE: 3.24 CM (ref 2.1–4)
LEFT VENTRICLE DIASTOLIC VOLUME INDEX: 69.25 ML/M2
LEFT VENTRICLE DIASTOLIC VOLUME: 128.81 ML
LEFT VENTRICLE MASS INDEX: 71 G/M2
LEFT VENTRICLE SYSTOLIC VOLUME INDEX: 22.7 ML/M2
LEFT VENTRICLE SYSTOLIC VOLUME: 42.16 ML
LEFT VENTRICULAR INTERNAL DIMENSION IN DIASTOLE: 5.19 CM (ref 3.5–6)
LEFT VENTRICULAR MASS: 132.29 G
PISA TR MAX VEL: 2.27 M/S
PV MEAN GRADIENT: 1 MMHG
PV PEAK GRADIENT: 18 MMHG
PV PEAK VELOCITY: 2.1 M/S
RIGHT VENTRICULAR END-DIASTOLIC DIMENSION: 5.43 CM
TR MAX PG: 21 MMHG
Z-SCORE OF LEFT VENTRICULAR DIMENSION IN END DIASTOLE: 0.03
Z-SCORE OF LEFT VENTRICULAR DIMENSION IN END SYSTOLE: 0.12

## 2023-09-29 PROCEDURE — 99238 HOSP IP/OBS DSCHRG MGMT 30/<: CPT | Mod: ,,, | Performed by: PEDIATRICS

## 2023-09-29 PROCEDURE — 25000003 PHARM REV CODE 250: Performed by: PEDIATRICS

## 2023-09-29 PROCEDURE — 99238 PR HOSPITAL DISCHARGE DAY,<30 MIN: ICD-10-PCS | Mod: ,,, | Performed by: PEDIATRICS

## 2023-09-29 RX ORDER — CLOPIDOGREL BISULFATE 75 MG/1
75 TABLET ORAL DAILY
Qty: 30 TABLET | Refills: 11 | Status: SHIPPED | OUTPATIENT
Start: 2023-09-30 | End: 2024-03-27

## 2023-09-29 RX ORDER — NAPROXEN SODIUM 220 MG/1
81 TABLET, FILM COATED ORAL DAILY
Qty: 90 TABLET | Refills: 3 | Status: SHIPPED | OUTPATIENT
Start: 2023-09-30 | End: 2024-09-29

## 2023-09-29 RX ADMIN — ASPIRIN 81 MG 81 MG: 81 TABLET ORAL at 08:09

## 2023-09-29 RX ADMIN — CARVEDILOL 12.5 MG: 12.5 TABLET, FILM COATED ORAL at 08:09

## 2023-09-29 RX ADMIN — AMIODARONE HYDROCHLORIDE 200 MG: 200 TABLET ORAL at 08:09

## 2023-09-29 RX ADMIN — SPIRONOLACTONE 25 MG: 25 TABLET ORAL at 08:09

## 2023-09-29 RX ADMIN — CLOPIDOGREL BISULFATE 75 MG: 75 TABLET ORAL at 08:09

## 2023-09-29 RX ADMIN — SACUBITRIL AND VALSARTAN 1 TABLET: 49; 51 TABLET, FILM COATED ORAL at 08:09

## 2023-09-29 RX ADMIN — FUROSEMIDE 20 MG: 20 TABLET ORAL at 08:09

## 2023-09-29 NOTE — PLAN OF CARE
Sw conducted very brief assessment with patient and family as he was preparing leave. No social needs or concerns, excellent support from spouse and family. Follow up appointments added to AVS.    Mena Rudolph, [facility]  Case Management Department  [email or web address]    [facility] - Cardiology Stepdown  Discharge Assessment    Primary Care Provider: Johnnie Ackerman MD     Discharge Assessment (most recent)       BRIEF DISCHARGE ASSESSMENT - 09/29/23 1145          Discharge Planning    Assessment Type Discharge Planning Brief Assessment     Resource/Environmental Concerns none     Support Systems Spouse/significant other;Family members     Equipment Currently Used at Home none     Current Living Arrangements home     Patient/Family Anticipates Transition to home     DME Needed Upon Discharge  none     Discharge Plan A Home with family     Discharge Plan B Home with family        Physical Activity    On average, how many days per week do you engage in moderate to strenuous exercise (like a brisk walk)? 4 days     On average, how many minutes do you engage in exercise at this level? 40 min        Financial Resource Strain    How hard is it for you to pay for the very basics like food, housing, medical care, and heating? Not hard at all        Housing Stability    In the last 12 months, was there a time when you were not able to pay the mortgage or rent on time? No     In the last 12 months, how many places have you lived? 1     In the last 12 months, was there a time when you did not have a steady place to sleep or slept in a shelter (including now)? No        Transportation Needs    In the past 12 months, has lack of transportation kept you from medical appointments or from getting medications? No     In the past 12 months, has lack of transportation kept you from meetings, work, or from getting things needed for daily living? No        Food Insecurity    Within the past 12 months, you worried that your food would  run out before you got the money to buy more. Never true     Within the past 12 months, the food you bought just didn't last and you didn't have money to get more. Never true        Stress    Do you feel stress - tense, restless, nervous, or anxious, or unable to sleep at night because your mind is troubled all the time - these days? Only a little        Social Connections    In a typical week, how many times do you talk on the phone with family, friends, or neighbors? Three times a week     How often do you get together with friends or relatives? Twice a week     Are you , , , , never , or living with a partner?                        [facility] - Cardiology Stepdown  Discharge Final Note    Primary Care Provider: Johnnie Ackerman MD    Expected Discharge Date: 9/29/2023    Final Discharge Note (most recent)       Final Note - 09/29/23 1158          Final Note    Assessment Type Final Discharge Note     Anticipated Discharge Disposition Home or Self Care     What phone number can be called within the next 1-3 days to see how you are doing after discharge? [phone]     Hospital Resources/Appts/Education Provided Provided patient/caregiver with written discharge plan information;Appointments scheduled and added to AVS;Post-Acute resouces added to AVS        Post-Acute Status    Post-Acute Authorization Other     Other Status No Post-Acute Service Needs     Discharge Delays None known at this time                     Important Message from Medicare           Future Appointments   Date Time Provider Department Center   [facility]   10/11/2023  9:30 AM EKG, APPT [facility] EKG [facility]   10/11/2023 10:00 AM John Galvan MD [facility]

## 2023-09-29 NOTE — DISCHARGE INSTRUCTIONS
AFTER THE PROCEDURE:  You may remove the bandage in 24 hours and wash with soap and water.  You may shower, but do not soak in a tub for three days.     PRECAUTIONS FOR THE NEXT 24 HOURS:  If you need to cough, sneeze, have a bowel movement, or bear down, hold pressure over your bandage.  Do not  anything heavier than a gallon of milk(about 5 pounds)  Avoid excessive bending over.    SYMPTOMS TO WATCH FOR AND REPORT TO YOUR DOCTOR:  BLEEDING: hold pressure over the site until bleeding stops. Proceed to Emergency Room by ambulance (do not drive yourself) if unable to stop bleeding. Notify your doctor.  HEMATOMA (hard bruise under the skin): Surya around the bruise if one develops. Call your doctor if it increases in size or if you have difficulty talking, swallowing, breathing or anything unusual.  SIGNS OF INFECTION: Fever (temperature over 100.5 F), pus or redness  RASH  CHEST PAIN OR SHORTNESS OF BREATH    You may call the Pediatric Cardiology Service doctor on call at [phone].

## 2023-09-29 NOTE — NURSING TRANSFER
Nursing Transfer Note    Sending Transfer Note      9/28/2023 2025 PM  Transfer via stretcher  From [facility] to [facility]   Transfered with cardiac monitoring, bag/mask and O2  Transported by: KHUSHBU Brasher RN, YASIR Sarabia RN  Report given as documented in PER Handoff on Doc Flowsheet  VS's per Doc Flowsheet  Medicines sent: No  Chart sent with patient: Yes  What caregiver / guardian was Notified of transfer: Spouse  KHUSHBU Brasher RN  9/28/2023 8:25 PM

## 2023-09-29 NOTE — DISCHARGE SUMMARY
[facility] - Cardiology Stepdown  Discharge Note  Short Stay    Procedure(s) (LRB):  Catheterization, Heart, Combined Right and Retrograde Left, for Congenital Heart Defect (N/A)  Replace, Pulmonary, Valve, Pediatric (N/A)  Angiogram, Pulmonary, Pediatric  Valvuloplasty, Pulmonary, Pediatric  Angiogram, Coronary, Pediatric      OUTCOME: Patient tolerated treatment/procedure well without complication and is now ready for discharge.    DISPOSITION: Home or Self Care    FINAL DIAGNOSIS:  Tetralogy of Fallot s/p repair    FOLLOWUP: In clinic    DISCHARGE INSTRUCTIONS:    Discharge Procedure Orders   ACCEPT - Ambulatory referral/consult to Cardiac Electrophysiology   Standing Status: Future   Referral Priority: Routine Referral Type: Consultation   Referral Reason: Specialty Services Required   Requested Specialty: Cardiology   Number of Visits Requested: 1     Diet Adult Regular     No dressing needed     Notify your health care provider if you experience any of the following:     Notify your health care provider if you experience any of the following:  increased confusion or weakness     Notify your health care provider if you experience any of the following:  persistent dizziness, light-headedness, or visual disturbances     Notify your health care provider if you experience any of the following:  worsening rash     Notify your health care provider if you experience any of the following:  severe persistent headache     Notify your health care provider if you experience any of the following:  difficulty breathing or increased cough     Notify your health care provider if you experience any of the following:  redness, tenderness, or signs of infection (pain, swelling, redness, odor or green/yellow discharge around incision site)     Notify your health care provider if you experience any of the following:  severe uncontrolled pain     Notify your health care provider if you experience any of the following:  persistent  nausea and vomiting or diarrhea     Notify your health care provider if you experience any of the following:  temperature >100.4     Activity as tolerated         Clinical Reference Documents Added to Patient Instructions         Document    CARDIAC CATHETERIZATION DISCHARGE INSTRUCTIONS (ENGLISH)            TIME SPENT ON DISCHARGE: 20 minutes

## 2023-09-29 NOTE — PLAN OF CARE
Patient remains sedated following cath procedure. Cath site dressing to left groin CDI with 2+ pulses in feet. Skin warm with CRT 2-3 seconds. Plan to remain flat until bleeding to right groin controlled and transfer to peds floor this evening. See note regarding right groin. Caregiver at bedside. Plan of care reviewed and questions answered.

## 2023-09-29 NOTE — ANESTHESIA POSTPROCEDURE EVALUATION
Anesthesia Post Evaluation    Patient: Cristobal Kidd III    Procedure(s) Performed: Procedure(s) (LRB):  Catheterization, Heart, Combined Right and Retrograde Left, for Congenital Heart Defect (N/A)  Replace, Pulmonary, Valve, Pediatric (N/A)  Angiogram, Pulmonary, Pediatric  Valvuloplasty, Pulmonary, Pediatric  Angiogram, Coronary, Pediatric    Final Anesthesia Type: general      Patient location during evaluation: PACU  Patient participation: Yes- Able to Participate  Level of consciousness: awake and alert  Post-procedure vital signs: reviewed and stable  Pain management: adequate  Airway patency: patent    PONV status at discharge: No PONV  Anesthetic complications: no      Cardiovascular status: blood pressure returned to baseline  Respiratory status: unassisted  Hydration status: euvolemic  Follow-up not needed.          Vitals Value Taken Time   BP 92/51 09/29/23 0805   Temp 36.8 °C (98.2 °F) 09/29/23 0805   Pulse 63 09/29/23 1000   Resp 14 09/29/23 0805   SpO2 94 % 09/29/23 0805         No case tracking events are documented in the log.      Pain/Buck Score: Presence of Pain: denies (9/28/2023  7:00 PM)  Pain Rating Prior to Med Admin: 4 (9/28/2023  7:46 PM)  Pain Rating Post Med Admin: 6 (9/28/2023  8:16 PM)  Buck Score: 9 (9/28/2023  6:00 PM)

## 2023-09-29 NOTE — PLAN OF CARE
Peds Cards is primary service not HTS  Patient will be followed by their team for admit and discharge  PEDS cards to set up follow up appts  Patient will likely dc today  D/W Dr Chen

## 2023-09-29 NOTE — NURSING
0800: AAOx4, wife at bedside. Right and left groin site dressings intact and patent.     1125: Discharge instructions reviewed. Telemetry box and PIVs removed. Pt and wife verbalize understanding of all discharge information  .  1217: Printed AVS provided to patient, pt discharged

## 2023-10-02 LAB
BLD PROD TYP BPU: NORMAL
BLD PROD TYP BPU: NORMAL
BLOOD UNIT EXPIRATION DATE: NORMAL
BLOOD UNIT EXPIRATION DATE: NORMAL
BLOOD UNIT TYPE CODE: 6200
BLOOD UNIT TYPE CODE: 6200
BLOOD UNIT TYPE: NORMAL
BLOOD UNIT TYPE: NORMAL
CODING SYSTEM: NORMAL
CODING SYSTEM: NORMAL
CROSSMATCH INTERPRETATION: NORMAL
CROSSMATCH INTERPRETATION: NORMAL
DISPENSE STATUS: NORMAL
DISPENSE STATUS: NORMAL
NUM UNITS TRANS PACKED RBC: NORMAL
NUM UNITS TRANS PACKED RBC: NORMAL

## 2023-10-05 NOTE — PHYSICIAN QUERY
PT Name: Cristobal Kidd III  MR #: [id number]     DOCUMENTATION CLARIFICATION     CDS/: Molly Del Valle RN               Contact information: [email or web address]  This form is a permanent document in the medical record.     Query Date: October 5, 2023    By submitting this query, we are merely seeking further clarification of documentation.  Please utilize your independent clinical judgment when addressing the question(s) below.    The Medical Record contains the following   Indicators Supporting Clinical Findings Location in Medical Record   x Heart Failure documented CHF (congestive heart failure), NYHA class II, chronic, combined   Procedure note 9/28    BNP     x EF/Echo Echo:  IMPRESSION:  Limited echocardiogram following percutaneous pulmonary valve placement  Qualitative impression of at least moderate dilation of the right atrium.  Images consistent with defibrillator lead crossing tricuspid valve with trivial to mild insufficiency.  Qualitative impression of at least moderately dilated right ventricle with good systolic function.  Right ventricular pressure estimated 21 mmHg above right atrial pressure from reasonably well defined TR doppler profile.    Echodensity consistent with patch repair of ventricular septal defect and malalignment of the ventricular septum with no residual shunt demonstrated.  Imaging consistent with stent mounted percutaneous valve in pulmonary position with peak velocity < 2.2 m/sec, mean gradient < 11 mm Hg and trivial insufficiency.  Limited images consistent with normal-sized main and proximal right pulmonary arteries.  There is narrowing of the left pulmonary artery with peak velocity across the area not well demonstrated.  Qualitative impression of mild dilation of the left atrium.  Qualitative impression of normal left ventricular size and structure.  Very flattened septal motion with good movement of the LV free wall, SF = 38% and EF estimated 55 -60% from apical  views.   There is a trileaflet aortic valve with no evidence of stenosis or significant insufficiency.  Qualitative impression of mildly dilated ascending aorta and otherwise normal aortic arch with left aortic branching pattern.  There is no evidence for coarctation.  No pericardial effusion.    Echo 9/29    Radiology findings      Subjective/Objective Respiratory Conditions      Recent/Current MI      Heart Transplant, LVAD      Edema, JVD      Ascites     x Diuretics/Meds Furosemide 20 mg PO QD  Spironolactone 25 mg PO BID   MAR 9/28-9/29  MAR 9/28-9/29    Other Treatment     x Other HISTORY OF PRESENT ILLNESS:  I had the pleasure of seeing Pat today in consultation in the Adult Congenital Clinic at the [facility].  As you know, he is a 53-year-old male with the following cardiac diagnoses:  1. History of tetralogy of Fallot status post 2 surgeries at [facility], 1st at about 3 years of age, 2nd at about 7 years of age.  Both performed via median sternotomy.    - mild pulmonary stenosis and likely moderate pulmonary insufficiency  - severe proximal LPA narrowing on CTA 8/16/22 at [facility].  2. History of ventricular tachycardia status post Medtronic ICD implantation  - device and leads are MRI compatible.  Generator model number is [id number], pacer lead [id number], ICD lead [id number].  3. Reported history of pulmonary hypertension  4. Reported history of nonischemic cardiomyopathy  - currently with low normal left ventricular function  5. Hypertension  6. Family history of coronary artery disease, father with history  7. Mild aortic insufficiency with mild aortic root enlargement, typical for tetralogy Interval H&P 9/28                                           Heart failure is a clinical diagnosis which includes symptomatic fluid retention, elevated intracardiac pressures, and/or the inability of the heart to deliver adequate blood flow.    Heart Failure with reduced Ejection Fraction (HFrEF) or  Systolic Heart Failure (loses ability to contract normally, EF is <40%)    Heart Failure with preserved Ejection Fraction (HFpEF) or Diastolic Heart Failure (stiff ventricles, does not relax properly, EF is >50%)     Heart Failure with Combined Systolic and Diastolic Failure (stiff ventricles, does not relax properly and EF is <50%)    Mid-range or mildly reduced ejection fraction (HFmrEF) is classified as systolic heart failure.  Congestive heart failure with a recovered EF is classified as Diastolic Heart Failure.  Common clues to acute exacerbation:  Rapidly progressive symptoms (w/in 2 weeks of presentation), using IV diuretics, using supplemental O2, pulmonary edema on Xray, new or worsening pleural effusion, +JVD or other signs of volume overload, MI w/in 4 weeks, and/or BNP >500  The clinical guidelines noted are only system guidelines, and do not replace the providers clinical judgment.    Provider, please further specify the heart failure diagnosis associated with the above clinical findings.    [   ]  Chronic Systolic Heart Failure (HFrEF or HFmrEF) - preexisting and stable   [   ]  Chronic Diastolic Heart Failure (HFpEF) - preexisting and stable   [X ]  Chronic Combined Systolic and Diastolic Heart Failure - pre-existing and stable   [   ]  Other (please specify): ___________________________________       Please document in your progress notes daily for the duration of treatment until resolved and include in your discharge summary.    References:  American Heart Association editorial staff. (2017, May). Ejection Fraction Heart Failure Measurement. American Heart Association. https://www.heart.org/en/health-topics/heart-failure/diagnosing-heart-failure/ejection-fraction-heart-failure-measurement#:~:text=Ejection%20fraction%20(EF)%20is%20a,pushed%20out%20with%20each%20heartbeat  CARLOS Del Real (2020, December 15). Heart failure with preserved ejection fraction: Clinical manifestations and diagnosis.  [employer]. https://www.[employer]/contents/heart-failure-with-preserved-ejection-fraction-clinical-manifestations-and-diagnosis.  ICD-10-CM/PCS Coding Clinic Third Quarter ICD-10, Effective with discharges: September 8, 2020 [facility] § Heart failure with mid-range or mildly reduced ejection fraction (2020).  ICD-10-CM/PCS Coding Clinic Third Quarter ICD-10, Effective with discharges: September 8, 2020 [facility] § Heart failure with recovered ejection fraction (2020).  Form No. [id number]

## 2023-10-11 ENCOUNTER — HOSPITAL ENCOUNTER (OUTPATIENT)
Dept: CARDIOLOGY | Facility: CLINIC | Age: 53
Discharge: HOME | End: 2023-10-11
Payer: COMMERCIAL

## 2023-10-11 ENCOUNTER — HOSPITAL ENCOUNTER (OUTPATIENT)
Dept: CARDIOLOGY | Facility: HOSPITAL | Age: 53
Discharge: HOME | End: 2023-10-11
Attending: PEDIATRICS
Payer: COMMERCIAL

## 2023-10-11 ENCOUNTER — OFFICE VISIT (OUTPATIENT)
Dept: CARDIOLOGY | Facility: CLINIC | Age: 53
End: 2023-10-11
Payer: COMMERCIAL

## 2023-10-11 VITALS
DIASTOLIC BLOOD PRESSURE: 80 MMHG | HEART RATE: 61 BPM | HEIGHT: 69 IN | BODY MASS INDEX: 23.25 KG/M2 | WEIGHT: 156.31 LBS | OXYGEN SATURATION: 97 % | SYSTOLIC BLOOD PRESSURE: 144 MMHG | WEIGHT: 157 LBS | HEIGHT: 69 IN | BODY MASS INDEX: 23.15 KG/M2

## 2023-10-11 DIAGNOSIS — Z87.74 TETRALOGY OF FALLOT S/P REPAIR: ICD-10-CM

## 2023-10-11 DIAGNOSIS — Q24.9 ADULT CONGENITAL HEART DISEASE: ICD-10-CM

## 2023-10-11 DIAGNOSIS — Q24.9 ADULT CONGENITAL HEART DISEASE: Primary | ICD-10-CM

## 2023-10-11 DIAGNOSIS — I37.0 NONRHEUMATIC PULMONARY VALVE STENOSIS: ICD-10-CM

## 2023-10-11 DIAGNOSIS — Q25.6 CONGENITAL STENOSIS OF LEFT PULMONARY ARTERY: ICD-10-CM

## 2023-10-11 DIAGNOSIS — I37.1 NONRHEUMATIC PULMONARY VALVE INSUFFICIENCY: ICD-10-CM

## 2023-10-11 DIAGNOSIS — Z95.2 PULMONARY VALVE REPLACED: ICD-10-CM

## 2023-10-11 PROBLEM — I42.8 NON-ISCHEMIC CARDIOMYOPATHY: Status: RESOLVED | Noted: 2022-08-17 | Resolved: 2023-10-11

## 2023-10-11 LAB
ASCENDING AORTA: 3.61 CM
AV INDEX (PROSTH): 0.41
AV MEAN GRADIENT: 6 MMHG
AV PEAK GRADIENT: 10 MMHG
AV VALVE AREA BY VELOCITY RATIO: 2.06 CM²
AV VALVE AREA: 2.15 CM²
AV VELOCITY RATIO: 0.39
BSA FOR ECHO PROCEDURE: 1.86 M2
CV ECHO LV RWT: 0.36 CM
DOP CALC AO PEAK VEL: 1.57 M/S
DOP CALC AO VTI: 36.94 CM
DOP CALC LVOT AREA: 5.2 CM2
DOP CALC LVOT DIAMETER: 2.58 CM
DOP CALC LVOT PEAK VEL: 0.62 M/S
DOP CALC LVOT STROKE VOLUME: 79.48 CM3
DOP CALC RVOT PEAK VEL: 0.87 M/S
DOP CALC RVOT VTI: 22.07 CM
DOP CALCLVOT PEAK VEL VTI: 15.21 CM
E WAVE DECELERATION TIME: 206.29 MSEC
E/A RATIO: 1.25
E/E' RATIO: 8.57 M/S
ECHO LV POSTERIOR WALL: 0.9 CM (ref 0.6–1.1)
FRACTIONAL SHORTENING: 29 % (ref 28–44)
INTERVENTRICULAR SEPTUM: 0.79 CM (ref 0.6–1.1)
IVRT: 102.76 MSEC
LA MAJOR: 5.79 CM
LA MINOR: 5.98 CM
LA WIDTH: 4.49 CM
LEFT ATRIUM SIZE: 4.54 CM
LEFT ATRIUM VOLUME INDEX MOD: 45.1 ML/M2
LEFT ATRIUM VOLUME INDEX: 54.8 ML/M2
LEFT ATRIUM VOLUME MOD: 83.91 CM3
LEFT ATRIUM VOLUME: 101.94 CM3
LEFT INTERNAL DIMENSION IN SYSTOLE: 3.58 CM (ref 2.1–4)
LEFT VENTRICLE DIASTOLIC VOLUME INDEX: 64.98 ML/M2
LEFT VENTRICLE DIASTOLIC VOLUME: 120.87 ML
LEFT VENTRICLE MASS INDEX: 80 G/M2
LEFT VENTRICLE SYSTOLIC VOLUME INDEX: 28.9 ML/M2
LEFT VENTRICLE SYSTOLIC VOLUME: 53.68 ML
LEFT VENTRICULAR INTERNAL DIMENSION IN DIASTOLE: 5.05 CM (ref 3.5–6)
LEFT VENTRICULAR MASS: 148.19 G
LV LATERAL E/E' RATIO: 9 M/S
LV SEPTAL E/E' RATIO: 8.18 M/S
MV A" WAVE DURATION": 8.75 MSEC
MV PEAK A VEL: 0.72 M/S
MV PEAK E VEL: 0.9 M/S
MV STENOSIS PRESSURE HALF TIME: 59.82 MS
MV VALVE AREA P 1/2 METHOD: 3.68 CM2
PISA TR MAX VEL: 3.14 M/S
PULM VEIN S/D RATIO: 0.64
PV MEAN GRADIENT: 10 MMHG
PV PEAK D VEL: 0.47 M/S
PV PEAK GRADIENT: 16 MMHG
PV PEAK S VEL: 0.3 M/S
PV PEAK VELOCITY: 2.01 M/S
RA MAJOR: 5.54 CM
RA WIDTH: 4.5 CM
SINUS: 3.46 CM
STJ: 2.54 CM
TDI LATERAL: 0.1 M/S
TDI SEPTAL: 0.11 M/S
TDI: 0.11 M/S
TR MAX PG: 39 MMHG
Z-SCORE OF LEFT VENTRICULAR DIMENSION IN END DIASTOLE: -0.24
Z-SCORE OF LEFT VENTRICULAR DIMENSION IN END SYSTOLE: 0.9

## 2023-10-11 PROCEDURE — 93325 DOPPLER ECHO COLOR FLOW MAPG: CPT | Mod: 26,,, | Performed by: PEDIATRICS

## 2023-10-11 PROCEDURE — 93320 ECHO (CUPID ONLY): ICD-10-PCS | Mod: 26,,, | Performed by: PEDIATRICS

## 2023-10-11 PROCEDURE — 93325 ECHO (CUPID ONLY): ICD-10-PCS | Mod: 26,,, | Performed by: PEDIATRICS

## 2023-10-11 PROCEDURE — 93005 ELECTROCARDIOGRAM TRACING: CPT | Mod: S$GLB,,, | Performed by: PEDIATRICS

## 2023-10-11 PROCEDURE — 93010 EKG 12-LEAD: ICD-10-PCS | Mod: S$GLB,,, | Performed by: INTERNAL MEDICINE

## 2023-10-11 PROCEDURE — 99999 PR PBB SHADOW E&M-EST. PATIENT-LVL III: CPT | Mod: PBBFAC,,, | Performed by: PEDIATRICS

## 2023-10-11 PROCEDURE — 93303 ECHO (CUPID ONLY): ICD-10-PCS | Mod: 26,,, | Performed by: PEDIATRICS

## 2023-10-11 PROCEDURE — 99999 PR PBB SHADOW E&M-EST. PATIENT-LVL III: ICD-10-PCS | Mod: PBBFAC,,, | Performed by: PEDIATRICS

## 2023-10-11 PROCEDURE — 93010 ELECTROCARDIOGRAM REPORT: CPT | Mod: S$GLB,,, | Performed by: INTERNAL MEDICINE

## 2023-10-11 PROCEDURE — 99214 PR OFFICE/OUTPT VISIT, EST, LEVL IV, 30-39 MIN: ICD-10-PCS | Mod: S$GLB,,, | Performed by: PEDIATRICS

## 2023-10-11 PROCEDURE — 93303 ECHO TRANSTHORACIC: CPT | Mod: 26,,, | Performed by: PEDIATRICS

## 2023-10-11 PROCEDURE — 93303 ECHO TRANSTHORACIC: CPT

## 2023-10-11 PROCEDURE — 93005 EKG 12-LEAD: ICD-10-PCS | Mod: S$GLB,,, | Performed by: PEDIATRICS

## 2023-10-11 PROCEDURE — 99214 OFFICE O/P EST MOD 30 MIN: CPT | Mod: S$GLB,,, | Performed by: PEDIATRICS

## 2023-10-11 PROCEDURE — 93320 DOPPLER ECHO COMPLETE: CPT | Mod: 26,,, | Performed by: PEDIATRICS

## 2023-10-11 NOTE — PROGRESS NOTES
10/11/2023     re:Cristobal Kidd III  :1970    Johnnie Ackerman MD  [address] [facility]. [facility] [address]    Dr. Tejinder Encarnacion  [facility]     Dear Doctors:    Cristobal Kidd III is a 53 y.o. male seen in my [facility] clinic today for evaluation of tetralogy of Fallot.  To summarize his diagnoses are as follow:  1. History of tetralogy of Fallot status post 2 surgeries at [facility], 1st at about 3 years of age, 2nd at about 7 years of age.  Both performed via median sternotomy.    - severe pulmonary valve insufficiency with severe right ventricular enlargement and preserved function now status post 25 mm [place] valve implantation 2023 with excellent result   - no stenosis or insufficiency of the new pulmonary valve  - severe proximal LPA narrowing   2. History of ventricular tachycardia status post Medtronic ICD implantation  - device and leads are MRI compatible.  Generator model number is [id number], pacer lead [id number], ICD lead [id number].   - now status post ablation procedure 2023 by Dr. Albert  - followed by Dr. Encarnacion  3. Borderline pulmonary hypertension in the right pulmonary artery exacerbated by severe left pulmonary artery stenosis  4. Reported history of nonischemic cardiomyopathy  - currently with normal left ventricular function  5. Hypertension  6. Family history of coronary artery disease, father with history  - normal coronary arteries on catheterization 2023  7. Mild aortic insufficiency with mild aortic root enlargement, typical for tetralogy    To summarize, my recommendations are as follows:  Continue aspirin indefinitely.  Continue Plavix for 6 months after his recent procedure.  Continue follow-up with Dr. Encarnacion  Follow-up with me in 6 months with repeat echocardiogram and CPX testing.  We can space his appointments out to yearly at that time.  We will consider intervention on the left  pulmonary artery at that time.  I would recommend SBE prophylaxis before dental work.    Discussion:  His heart looks great.  The new pulmonary valve is working very well.  Given his normal right ventricular function going into this procedure, I would expect an excellent result.  I would predict improvement in his right ventricular enlargement and improved exercise tolerance.  He is left with significant proximal left pulmonary artery stenosis that may end up needing intervention as well.  We will reassess when we see him in 6 months.    History of present illness:  He had his catheterization procedure 2 weeks ago.  He really feels great.  He feels like he has more energy.  No shortness of breath or chest pain.  No palpitations.  Occasional dizziness when he stands up.  No edema.  Mild bruising at his catheterization site that is improving.    Cath 9/28/23:  1) Repaired tetralogy of Fallot with free pulmonary insufficiency and RV enlargement.  2) Severe proximal LPA stenosis  3) Normal PA pressure, cardiac output, and vascular resistance calculations.  4) Successful TPV 25 [place] valve implantation (gradient 5mmHg, no insufficiency)  5) Normal coronary angiography      MRI 6/1/23:  Biatrial enlargement.   Difficult to assess TR in setting of right heart ICD lead  Increased right ventricular volumes. (RVEDV 168 cc/m2 for BSA, RVESV 88 cc/m2 for BSA).  RVEF 48 %. There is artifact from the ICD affecting the superior/anterior RV limiting accuracy.  Normal left ventricular volume with LVEF 44 %.  Dilated and thin proximal RVOT consistent with patch. There is pulmonary valve tissue in the RVOT. Unrestricted PI with regurgitation fraction of 44%. Increased velocity to 3.1m/sec.   Dilated MPA. Dilated RPA with severe discrete proximal LPA stenosis. Discrepant pulmonary blood flow with 88% to RPA.   Mildly enlarged aortic root.     He does have a cousin with a history of tetralogy.  Otherwise, family history is negative  for congenital heart disease.    I reviewed medical records from [facility].  Patient seen there September 23, 2022.  In clinic his blood pressure was 130/79, heart rate 60, BMI 23.5.  A high-pitched harsh mid systolic murmur was present with radiation to the neck.  Patient was on Aldactone 25 mg twice a day, amiodarone, carvedilol 6.25 mg twice a day, Entresto 49/51 mg twice a day, Lasix 20 mg as needed, and Zyrtec.  Patient had blood work performed August 23, 2022.  NT pro BNP elevated at 252 with 121 being the upper limit of normal.  Basic metabolic panel normal with creatinine 1.2, potassium 5.  Sodium 138.  Calcium 10.  Blood work July 21, 2022:  Total cholesterol 186, HDL 39, , non HDL cholesterol elevated at 147.  Albumin 4.3.  Bilirubin upper limit of normal at 1.8.  AST and ALT normal.     Transthoracic echocardiogram September 23, 2022 with normal left ventricular systolic function, ejection fraction 55%.  No evidence of diastolic dysfunction.  Mild asymmetric septal left ventricular hypertrophy present.  Global longitudinal strain-16.8%.  Mild left atrial enlargement.  Reportedly with mild to moderate pulmonary insufficiency and mild-to-moderate tricuspid insufficiency with estimated pulmonary artery systolic pressure 55 mmHg.  Peak velocity across the pulmonary valve reported as 0.5 m/sec.  Right ventricular function reported as normal.  TAPSE 2.2 cm.  Aortic root 3.1 cm.  Aortic valve with mild aortic insufficiency.    EKG performed September 23, 2022 with AV sequential pacing.    Patient had a chest CTA August 16, 2022 at [facility]:  1. No evidence of pulmonary embolism  2. Marked narrowing of the proximal aspect of the left main pulmonary artery and diffuse enlargement of the right pulmonary artery     The review of systems is as noted above. It is otherwise negative for other symptoms related to the general, neurological, psychiatric,  endocrine, gastrointestinal, genitourinary, respiratory, dermatologic, musculoskeletal, hematologic, and immunologic systems.    Past Medical History:   Diagnosis Date    Fracture, tibia and fibula     left History of    Palpitations     Recurrent ventricular tachycardia      Past Surgical History:   Procedure Laterality Date    ABLATION N/A 4/24/2023    Procedure: VT Ablation;  Surgeon: Eric Albert MD;  Location[facility] CATH;  Service: Cardiology;  Laterality: N/A;  Pt has an AICD, ops # 13    ANGIOGRAM, CORONARY, PEDIATRIC  9/28/2023    Procedure: Angiogram, Coronary, Pediatric;  Surgeon: Sheree Chen III., MD;  Location: [facility] LAB;  Service: Cardiology;;    ANGIOGRAM, PULMONARY, PEDIATRIC  9/28/2023    Procedure: Angiogram, Pulmonary, Pediatric;  Surgeon: Sheree Chen III., MD;  Location: [facility] CATH LAB;  Service: Cardiology;;    COMBINED RIGHT AND RETROGRADE LEFT HEART CATHETERIZATION FOR CONGENITAL HEART DEFECT N/A 9/28/2023    Procedure: Catheterization, Heart, Combined Right and Retrograde Left, for Congenital Heart Defect;  Surgeon: Sheree Chen III., MD;  Location: [facility] CATH LAB;  Service: Cardiology;  Laterality: N/A;    INSERTION, ICD, DUAL CHAMBER  08/17/2022    REPLACE, PULMONARY, VALVE, PEDIATRIC N/A 9/28/2023    Procedure: Replace, Pulmonary, Valve, Pediatric;  Surgeon: Sheree Chen III., MD;  Location: [facility] CATH LAB;  Service: Cardiology;  Laterality: N/A;    TETRALOGY OF FALLOT REPAIR      VALVULOPLASTY, PULMONARY, PEDIATRIC  9/28/2023    Procedure: Valvuloplasty, Pulmonary, Pediatric;  Surgeon: Sheree Chen III., MD;  Location: [facility] CATH LAB;  Service: Cardiology;;     Family History   Problem Relation Age of Onset    Diabetes Mother     Heart attack Father     No Known Problems Sister     No Known Problems Sister     No Known Problems Sister      Social History     Socioeconomic History    Marital status:    Tobacco Use    Smoking status: Never     Passive  exposure: Never    Smokeless tobacco: Current     Types: Snuff, Chew     Social Determinants of Health     Financial Resource Strain: Low Risk  (9/29/2023)    Overall Financial Resource Strain (CARDIA)     Difficulty of Paying Living Expenses: Not hard at all   Food Insecurity: No Food Insecurity (9/29/2023)    Hunger Vital Sign     Worried About Running Out of Food in the Last Year: Never true     Ran Out of Food in the Last Year: Never true   Transportation Needs: No Transportation Needs (9/29/2023)    PRAPARE - Transportation     Lack of Transportation (Medical): No     Lack of Transportation (Non-Medical): No   Physical Activity: Sufficiently Active (9/29/2023)    Exercise Vital Sign     Days of Exercise per Week: 4 days     Minutes of Exercise per Session: 40 min   Stress: No Stress Concern Present (9/29/2023)    [employer] [place] [employer] - Occupational Stress Questionnaire     Feeling of Stress : Only a little   Social Connections: Unknown (9/29/2023)    Social Connection and Isolation Panel [NHANES]     Frequency of Communication with Friends and Family: Three times a week     Frequency of Social Gatherings with Friends and Family: Twice a week     Marital Status:    Housing Stability: Low Risk  (9/29/2023)    Housing Stability Vital Sign     Unable to Pay for Housing in the Last Year: No     Number of Places Lived in the Last Year: 1     Unstable Housing in the Last Year: No     Current Outpatient Medications on File Prior to Visit   Medication Sig Dispense Refill    ALPRAZolam (XANAX) 0.5 MG tablet Take 0.5 mg by mouth 3 (three) times daily. Take evening before procedure      amiodarone (PACERONE) 200 MG Tab Take 200 mg by mouth once daily. 200mg daily Mon, Wed, Friday   100mg every other day      aspirin 81 MG Chew Take 1 tablet (81 mg total) by mouth once daily. 90 tablet 3    carvediloL (COREG) 12.5 MG tablet Take 1 tablet (12.5 mg total) by mouth 2 (two) times daily with meals. 60  "tablet 11    cetirizine (ZYRTEC) 10 MG tablet Take 10 mg by mouth daily as needed.      clopidogreL (PLAVIX) 75 mg tablet Take 1 tablet (75 mg total) by mouth once daily. 30 tablet 11    furosemide (LASIX) 20 MG tablet Daily as needed for weight gain 2-3 lb in 24 hours, shortness of breath, swelling (Patient taking differently: Take 20 mg by mouth daily as needed. Daily as needed for weight gain 2-3 lb in 24 hours, shortness of breath, swelling) 30 tablet 11    sacubitriL-valsartan (ENTRESTO) 49-51 mg per tablet Take 1 tablet by mouth 2 (two) times daily. 60 tablet 10    spironolactone (ALDACTONE) 25 MG tablet Take 1 tablet (25 mg total) by mouth 2 (two) times daily. 60 tablet 11    [DISCONTINUED] metoprolol tartrate (LOPRESSOR) 25 MG tablet Take 25 mg by mouth 2 (two) times daily.      [DISCONTINUED] valsartan (DIOVAN) 160 MG tablet Take 160 mg by mouth once daily.       No current facility-administered medications on file prior to visit.     Review of patient's allergies indicates:  No Known Allergies    Vitals:    10/11/23 0938   BP: (!) 144/80   BP Location: Right arm   Pulse: 61   SpO2: 97%   Weight: 70.9 kg (156 lb 4.9 oz)   Height: 5' 9.02" (1.753 m)       In general, he is a very healthy-appearing nondysmorphic male in no apparent distress.  The eyes, nares, and oropharynx are clear.  Eyelids and conjunctiva are normal without drainage or erythema.  Pupils equal and round bilaterally.  The head is normocephalic and atraumatic.  The neck is supple without jugular venous distention or thyroid enlargement.  The lungs are clear to auscultation bilaterally.  Well-healed median sternotomy incision noted.  No evidence of thoracotomy incision.  First heart sound is normal, 2nd heart sound is fixed and split.  I do not hear a murmur.  The abdominal exam is benign without hepatosplenomegaly, tenderness, or distention.  Pulses are normal in all 4 extremities with brisk capillary refill and no clubbing, cyanosis, or " edema.  No rashes are noted.    I personally reviewed the following tests performed today and my interpretation follows:  EKG in clinic today reveals atrial and ventricular pacing.    The official echo report is pending.  I reviewed that study in detail.  My interpretation is as follows:  1. Excellent biventricular systolic function  2. No pericardial effusion  3. [place] valve in perfect position with no stenosis or insufficiency  4. Right pulmonary artery without obstruction   5. Left pulmonary artery not well seen on this study  6. Mild tricuspid insufficiency estimates mildly elevated right ventricular pressure     Thank you for referring this patient to our clinic.  Please call with any questions.    Sincerely,        John Galvan MD  Pediatric Cardiology  Adult Congenital Heart Disease  Pediatric Heart Failure and Transplantation  [facility] [address] [place]  [address]  [phone]

## 2024-03-05 ENCOUNTER — PATIENT MESSAGE (OUTPATIENT)
Dept: CARDIOLOGY | Facility: CLINIC | Age: 54
End: 2024-03-05
Payer: COMMERCIAL

## 2024-03-05 DIAGNOSIS — Z95.2 PULMONARY VALVE REPLACED: ICD-10-CM

## 2024-03-05 DIAGNOSIS — Q24.9 ADULT CONGENITAL HEART DISEASE: ICD-10-CM

## 2024-03-05 DIAGNOSIS — I50.42 CHF (CONGESTIVE HEART FAILURE), NYHA CLASS II, CHRONIC, COMBINED: ICD-10-CM

## 2024-03-05 DIAGNOSIS — Z87.74 TETRALOGY OF FALLOT S/P REPAIR: Primary | ICD-10-CM

## 2024-03-05 DIAGNOSIS — Q25.6 CONGENITAL STENOSIS OF LEFT PULMONARY ARTERY: ICD-10-CM

## 2024-03-27 RX ORDER — CLOPIDOGREL BISULFATE 75 MG/1
75 TABLET ORAL
Qty: 30 TABLET | Refills: 11 | Status: SHIPPED | OUTPATIENT
Start: 2024-03-27

## 2024-05-08 ENCOUNTER — HOSPITAL ENCOUNTER (OUTPATIENT)
Dept: CARDIOLOGY | Facility: HOSPITAL | Age: 54
Discharge: HOME | End: 2024-05-08
Attending: PEDIATRICS
Payer: COMMERCIAL

## 2024-05-08 ENCOUNTER — OFFICE VISIT (OUTPATIENT)
Dept: CARDIOLOGY | Facility: CLINIC | Age: 54
End: 2024-05-08
Payer: COMMERCIAL

## 2024-05-08 VITALS
HEIGHT: 69 IN | SYSTOLIC BLOOD PRESSURE: 112 MMHG | OXYGEN SATURATION: 99 % | WEIGHT: 154.75 LBS | DIASTOLIC BLOOD PRESSURE: 65 MMHG | BODY MASS INDEX: 22.92 KG/M2 | HEART RATE: 71 BPM

## 2024-05-08 VITALS — BODY MASS INDEX: 23.15 KG/M2 | HEIGHT: 69 IN | WEIGHT: 156.31 LBS

## 2024-05-08 VITALS
DIASTOLIC BLOOD PRESSURE: 67 MMHG | BODY MASS INDEX: 22.81 KG/M2 | WEIGHT: 154 LBS | SYSTOLIC BLOOD PRESSURE: 114 MMHG | HEIGHT: 69 IN | HEART RATE: 62 BPM

## 2024-05-08 DIAGNOSIS — Q24.9 ADULT CONGENITAL HEART DISEASE: ICD-10-CM

## 2024-05-08 DIAGNOSIS — Q25.6 CONGENITAL STENOSIS OF LEFT PULMONARY ARTERY: ICD-10-CM

## 2024-05-08 DIAGNOSIS — Z95.2 PULMONARY VALVE REPLACED: ICD-10-CM

## 2024-05-08 DIAGNOSIS — Z87.74 TETRALOGY OF FALLOT S/P REPAIR: ICD-10-CM

## 2024-05-08 DIAGNOSIS — I50.42 CHF (CONGESTIVE HEART FAILURE), NYHA CLASS II, CHRONIC, COMBINED: ICD-10-CM

## 2024-05-08 DIAGNOSIS — Z87.74 TETRALOGY OF FALLOT S/P REPAIR: Primary | ICD-10-CM

## 2024-05-08 LAB
ASCENDING AORTA: 3.43 CM
AV INDEX (PROSTH): 0.69
AV MEAN GRADIENT: 2 MMHG
AV PEAK GRADIENT: 4 MMHG
AV VALVE AREA BY VELOCITY RATIO: 4.4 CM²
AV VALVE AREA: 4.35 CM²
AV VELOCITY RATIO: 0.7
BSA FOR ECHO PROCEDURE: 1.86 M2
CV ECHO LV RWT: 0.28 CM
CV STRESS BASE HR: 62 BPM
DIASTOLIC BLOOD PRESSURE: 67 MMHG
DOP CALC AO PEAK VEL: 1 M/S
DOP CALC AO VTI: 24.82 CM
DOP CALC LVOT AREA: 6.3 CM2
DOP CALC LVOT DIAMETER: 2.83 CM
DOP CALC LVOT PEAK VEL: 0.7 M/S
DOP CALC LVOT STROKE VOLUME: 107.88 CM3
DOP CALC RVOT PEAK VEL: 0.81 M/S
DOP CALC RVOT VTI: 19.26 CM
DOP CALCLVOT PEAK VEL VTI: 17.16 CM
E WAVE DECELERATION TIME: 191 MSEC
E/A RATIO: 1.12
E/E' RATIO: 7.33 M/S
ECHO LV POSTERIOR WALL: 0.74 CM (ref 0.6–1.1)
FRACTIONAL SHORTENING: 30 % (ref 28–44)
INTERVENTRICULAR SEPTUM: 0.71 CM (ref 0.6–1.1)
LA MAJOR: 5.45 CM
LA MINOR: 5.76 CM
LA WIDTH: 3.63 CM
LEFT ATRIUM SIZE: 4.37 CM
LEFT ATRIUM VOLUME INDEX MOD: 24.3 ML/M2
LEFT ATRIUM VOLUME INDEX: 40.6 ML/M2
LEFT ATRIUM VOLUME MOD: 45.25 CM3
LEFT ATRIUM VOLUME: 75.52 CM3
LEFT INTERNAL DIMENSION IN SYSTOLE: 3.71 CM (ref 2.1–4)
LEFT VENTRICLE DIASTOLIC VOLUME INDEX: 71.74 ML/M2
LEFT VENTRICLE DIASTOLIC VOLUME: 133.43 ML
LEFT VENTRICLE MASS INDEX: 71 G/M2
LEFT VENTRICLE SYSTOLIC VOLUME INDEX: 31.5 ML/M2
LEFT VENTRICLE SYSTOLIC VOLUME: 58.6 ML
LEFT VENTRICULAR INTERNAL DIMENSION IN DIASTOLE: 5.27 CM (ref 3.5–6)
LEFT VENTRICULAR MASS: 131.31 G
LV LATERAL E/E' RATIO: 6.6 M/S
LV SEPTAL E/E' RATIO: 8.25 M/S
MV A" WAVE DURATION": 8.37 MSEC
MV PEAK A VEL: 0.59 M/S
MV PEAK E VEL: 0.66 M/S
OHS CV CPX 1 MINUTE RECOVERY HEART RATE: 130 BPM
OHS CV CPX 85 PERCENT MAX PREDICTED HEART RATE MALE: 140
OHS CV CPX ANAEROBIC THRESHOLD DIASTOLIC BLOOD PRESSURE: 62 MMHG
OHS CV CPX ANAEROBIC THRESHOLD HEART RATE: 94
OHS CV CPX ANAEROBIC THRESHOLD RATE PRESSURE PRODUCT: NORMAL
OHS CV CPX ANAEROBIC THRESHOLD SYSTOLIC BLOOD PRESSURE: 124
OHS CV CPX DATA GRADE - AT: 8.4
OHS CV CPX DATA GRADE - PEAK: 14.2
OHS CV CPX DATA O2 SAT - PEAK: 97
OHS CV CPX DATA O2 SAT - REST: 98
OHS CV CPX DATA SPEED - AT: 2.6
OHS CV CPX DATA SPEED - PEAK: 3.7
OHS CV CPX DATA TIME - AT: 4.07
OHS CV CPX DATA TIME - PEAK: 6.67
OHS CV CPX DATA VE/VCO2 - AT: 46
OHS CV CPX DATA VE/VCO2 - PEAK: 62
OHS CV CPX DATA VE/VO2 - AT: 41
OHS CV CPX DATA VE/VO2 - PEAK: 61
OHS CV CPX DATA VO2 - AT: 16
OHS CV CPX DATA VO2 - PEAK: 20.1
OHS CV CPX DATA VO2 - REST: 6.4
OHS CV CPX FEV1/FVC: 0.7
OHS CV CPX FORCED EXPIRATORY VOLUME: 1.72
OHS CV CPX FORCED VITAL CAPACITY (FVC): 2.47
OHS CV CPX HIGHEST VO: 36.7
OHS CV CPX MAX PREDICTED HEART RATE: 165
OHS CV CPX MAXIMAL VOLUNTARY VENTILATION (MVV) PREDICTED: 68.8
OHS CV CPX MAXIMAL VOLUNTARY VENTILATION (MVV): 45
OHS CV CPX MAXIUMUM EXERCISE VENTILATION (VE MAX): 80.5
OHS CV CPX PATIENT AGE: 55
OHS CV CPX PATIENT HEIGHT IN: 69
OHS CV CPX PATIENT IS FEMALE AGE 11-19: 0
OHS CV CPX PATIENT IS FEMALE AGE GREATER THAN 19: 0
OHS CV CPX PATIENT IS FEMALE AGE LESS THAN 11: 0
OHS CV CPX PATIENT IS FEMALE: 0
OHS CV CPX PATIENT IS MALE AGE 11-25: 0
OHS CV CPX PATIENT IS MALE AGE GREATER THAN 25: 1
OHS CV CPX PATIENT IS MALE AGE LESS THAN 11: 0
OHS CV CPX PATIENT IS MALE GREATER THAN 18: 1
OHS CV CPX PATIENT IS MALE LESS THAN OR EQUAL TO 18: 0
OHS CV CPX PATIENT IS MALE: 1
OHS CV CPX PATIENT WEIGHT RETURNED IN OZ: 2464
OHS CV CPX PEAK DIASTOLIC BLOOD PRESSURE: 64 MMHG
OHS CV CPX PEAK HEAR RATE: 136 BPM
OHS CV CPX PEAK RATE PRESSURE PRODUCT: NORMAL
OHS CV CPX PEAK SYSTOLIC BLOOD PRESSURE: 102 MMHG
OHS CV CPX PERCENT BODY FAT: 16.4
OHS CV CPX PERCENT MAX PREDICTED HEART RATE ACHIEVED: 82
OHS CV CPX PREDICTED VO2: 36.7 ML/KG/MIN
OHS CV CPX RATE PRESSURE PRODUCT PRESENTING: 7068
OHS CV CPX REST PET CO2: 24
OHS CV CPX VE/VCO2 SLOPE: 41.2
OHS CV RV/LV RATIO: 0.93 CM
PISA TR MAX VEL: 2.54 M/S
PULM VEIN S/D RATIO: 0.69
PV MEAN GRADIENT: 2 MMHG
PV PEAK D VEL: 0.52 M/S
PV PEAK GRADIENT: 12
PV PEAK S VEL: 0.36 M/S
PV PEAK VELOCITY: 1.72 M/S
RA MAJOR: 5.33 CM
RA WIDTH: 5.61 CM
RIGHT VENTRICULAR END-DIASTOLIC DIMENSION: 4.92 CM
SINUS: 3.55 CM
STJ: 3.23 CM
STRESS ECHO POST EXERCISE DUR MIN: 6 MINUTES
STRESS ECHO POST EXERCISE DUR SEC: 40 SECONDS
SYSTOLIC BLOOD PRESSURE: 114 MMHG
TDI LATERAL: 0.1 M/S
TDI SEPTAL: 0.08 M/S
TDI: 0.09 M/S
TR MAX PG: 26 MMHG
TRICUSPID ANNULAR PLANE SYSTOLIC EXCURSION: 1.61 CM
Z-SCORE OF LEFT VENTRICULAR DIMENSION IN END DIASTOLE: 0.18
Z-SCORE OF LEFT VENTRICULAR DIMENSION IN END SYSTOLE: 1.19

## 2024-05-08 PROCEDURE — 3078F DIAST BP <80 MM HG: CPT | Mod: CPTII,S$GLB,, | Performed by: PEDIATRICS

## 2024-05-08 PROCEDURE — 93303 ECHO TRANSTHORACIC: CPT | Mod: 26,,, | Performed by: PEDIATRICS

## 2024-05-08 PROCEDURE — 1159F MED LIST DOCD IN RCRD: CPT | Mod: CPTII,S$GLB,, | Performed by: PEDIATRICS

## 2024-05-08 PROCEDURE — 93320 DOPPLER ECHO COMPLETE: CPT | Mod: 26,,, | Performed by: PEDIATRICS

## 2024-05-08 PROCEDURE — 99214 OFFICE O/P EST MOD 30 MIN: CPT | Mod: S$GLB,,, | Performed by: PEDIATRICS

## 2024-05-08 PROCEDURE — 94621 CARDIOPULM EXERCISE TESTING: CPT | Mod: 26,,, | Performed by: INTERNAL MEDICINE

## 2024-05-08 PROCEDURE — 3008F BODY MASS INDEX DOCD: CPT | Mod: CPTII,S$GLB,, | Performed by: PEDIATRICS

## 2024-05-08 PROCEDURE — G2211 COMPLEX E/M VISIT ADD ON: HCPCS | Mod: S$GLB,,, | Performed by: PEDIATRICS

## 2024-05-08 PROCEDURE — 93320 DOPPLER ECHO COMPLETE: CPT

## 2024-05-08 PROCEDURE — 4010F ACE/ARB THERAPY RXD/TAKEN: CPT | Mod: CPTII,S$GLB,, | Performed by: PEDIATRICS

## 2024-05-08 PROCEDURE — 94621 CARDIOPULM EXERCISE TESTING: CPT

## 2024-05-08 PROCEDURE — 3074F SYST BP LT 130 MM HG: CPT | Mod: CPTII,S$GLB,, | Performed by: PEDIATRICS

## 2024-05-08 PROCEDURE — 99999 PR PBB SHADOW E&M-EST. PATIENT-LVL III: CPT | Mod: PBBFAC,,, | Performed by: PEDIATRICS

## 2024-05-08 PROCEDURE — 93325 DOPPLER ECHO COLOR FLOW MAPG: CPT | Mod: 26,,, | Performed by: PEDIATRICS

## 2024-05-08 RX ORDER — AMIODARONE HYDROCHLORIDE 100 MG/1
100 TABLET ORAL EVERY OTHER DAY
COMMUNITY
Start: 2024-03-12

## 2024-05-08 NOTE — PROGRESS NOTES
2024     re:Cristobal Kidd III  :1970    Johnnie Ackerman MD  [address] [facility] [address]    Dr. Tejinder Encarnacion  [facility]     Dear Doctors:    Cristobal Kidd III is a 53 y.o. male seen in my [facility] clinic today for evaluation of tetralogy of Fallot.  To summarize his diagnoses are as follow:  1. History of tetralogy of Fallot status post 2 surgeries at [facility], 1st at about 3 years of age, 2nd at about 7 years of age.  Both performed via median sternotomy.    - severe pulmonary valve insufficiency with severe right ventricular enlargement and preserved function now status post 25 mm [place] valve implantation 2023 with excellent result   - no stenosis or insufficiency of the new pulmonary valve  - proximal LPA narrowing   2. History of ventricular tachycardia status post Medtronic ICD implantation  - device and leads are MRI compatible.  Generator model number is [id number], pacer lead [id number], ICD lead [id number].   - now status post ablation procedure 2023 by Dr. Albert  - followed by Dr. Encarnacion  3. Borderline pulmonary hypertension in the right pulmonary artery exacerbated by severe left pulmonary artery stenosis  4. Reported history of nonischemic cardiomyopathy  - currently with normal left ventricular function  5. Hypertension  6. Family history of coronary artery disease, father with history  - normal coronary arteries on catheterization 2023  7. Mild aortic insufficiency with mild aortic root enlargement, typical for tetralogy    To summarize, my recommendations are as follows:  Continue aspirin indefinitely.  Stop plavix.  Continue follow-up with Dr. Encarnacion  Follow-up with me in 1 year with repeat echocardiogram, cardiac MRI and EKG.  Made need LPA dilation/stent in the future.  I would recommend SBE prophylaxis before dental work.  Follow up CPX testing from today.    Discussion:  His  heart looks great.  The new pulmonary valve is working very well.  Given his normal right ventricular function going into this procedure, I would expect an excellent result.  I would predict improvement in his right ventricular enlargement and improved exercise tolerance.  He is left with significant proximal left pulmonary artery stenosis that may end up needing intervention as well.  We will reassess when we see him in 1 year.    History of present illness:  Overall he has done well.  Mild dyspnea on exertion, but better than before the procedure.  No chest pain or palpitations.  No syncope or edema.  No fever.    Cath 9/28/23:  1) Repaired tetralogy of Fallot with free pulmonary insufficiency and RV enlargement.  2) Severe proximal LPA stenosis  3) Normal PA pressure, cardiac output, and vascular resistance calculations.  4) Successful TPV 25 [place] valve implantation (gradient 5mmHg, no insufficiency)  5) Normal coronary angiography      MRI 6/1/23:  Biatrial enlargement.   Difficult to assess TR in setting of right heart ICD lead  Increased right ventricular volumes. (RVEDV 168 cc/m2 for BSA, RVESV 88 cc/m2 for BSA).  RVEF 48 %. There is artifact from the ICD affecting the superior/anterior RV limiting accuracy.  Normal left ventricular volume with LVEF 44 %.  Dilated and thin proximal RVOT consistent with patch. There is pulmonary valve tissue in the RVOT. Unrestricted PI with regurgitation fraction of 44%. Increased velocity to 3.1m/sec.   Dilated MPA. Dilated RPA with severe discrete proximal LPA stenosis. Discrepant pulmonary blood flow with 88% to RPA.   Mildly enlarged aortic root.     He does have a cousin with a history of tetralogy.  Otherwise, family history is negative for congenital heart disease.    I reviewed medical records from [facility].  Patient seen there September 23, 2022.  In clinic his blood pressure was 130/79, heart rate 60, BMI 23.5.  A  high-pitched harsh mid systolic murmur was present with radiation to the neck.  Patient was on Aldactone 25 mg twice a day, amiodarone, carvedilol 6.25 mg twice a day, Entresto 49/51 mg twice a day, Lasix 20 mg as needed, and Zyrtec.  Patient had blood work performed August 23, 2022.  NT pro BNP elevated at 252 with 121 being the upper limit of normal.  Basic metabolic panel normal with creatinine 1.2, potassium 5.  Sodium 138.  Calcium 10.  Blood work July 21, 2022:  Total cholesterol 186, HDL 39, , non HDL cholesterol elevated at 147.  Albumin 4.3.  Bilirubin upper limit of normal at 1.8.  AST and ALT normal.     Transthoracic echocardiogram September 23, 2022 with normal left ventricular systolic function, ejection fraction 55%.  No evidence of diastolic dysfunction.  Mild asymmetric septal left ventricular hypertrophy present.  Global longitudinal strain-16.8%.  Mild left atrial enlargement.  Reportedly with mild to moderate pulmonary insufficiency and mild-to-moderate tricuspid insufficiency with estimated pulmonary artery systolic pressure 55 mmHg.  Peak velocity across the pulmonary valve reported as 0.5 m/sec.  Right ventricular function reported as normal.  TAPSE 2.2 cm.  Aortic root 3.1 cm.  Aortic valve with mild aortic insufficiency.    EKG performed September 23, 2022 with AV sequential pacing.    Patient had a chest CTA August 16, 2022 at [facility]:  1. No evidence of pulmonary embolism  2. Marked narrowing of the proximal aspect of the left main pulmonary artery and diffuse enlargement of the right pulmonary artery     The review of systems is as noted above. It is otherwise negative for other symptoms related to the general, neurological, psychiatric, endocrine, gastrointestinal, genitourinary, respiratory, dermatologic, musculoskeletal, hematologic, and immunologic systems.    Past Medical History:   Diagnosis Date    Fracture, tibia and fibula     left History of    Palpitations      Recurrent ventricular tachycardia      Past Surgical History:   Procedure Laterality Date    ABLATION N/A 4/24/2023    Procedure: VT Ablation;  Surgeon: Eric Albert MD;  Location: [facility] CATH;  Service: Cardiology;  Laterality: N/A;  Pt has an AICD, ops # 13    ANGIOGRAM, CORONARY, PEDIATRIC  9/28/2023    Procedure: Angiogram, Coronary, Pediatric;  Surgeon: Sheree Chen III., MD;  Location: [facility] CATH LAB;  Service: Cardiology;;    ANGIOGRAM, PULMONARY, PEDIATRIC  9/28/2023    Procedure: Angiogram, Pulmonary, Pediatric;  Surgeon: Sheree Chen III., MD;  Location: [facility] CATH LAB;  Service: Cardiology;;    COMBINED RIGHT AND RETROGRADE LEFT HEART CATHETERIZATION FOR CONGENITAL HEART DEFECT N/A 9/28/2023    Procedure: Catheterization, Heart, Combined Right and Retrograde Left, for Congenital Heart Defect;  Surgeon: Sheree Chen III., MD;  Location: [facility] CATH LAB;  Service: Cardiology;  Laterality: N/A;    INSERTION, ICD, DUAL CHAMBER  08/17/2022    REPLACE, PULMONARY, VALVE, PEDIATRIC N/A 9/28/2023    Procedure: Replace, Pulmonary, Valve, Pediatric;  Surgeon: Sheree Chen III., MD;  Location: [facility] CATH LAB;  Service: Cardiology;  Laterality: N/A;    TETRALOGY OF FALLOT REPAIR      VALVULOPLASTY, PULMONARY, PEDIATRIC  9/28/2023    Procedure: Valvuloplasty, Pulmonary, Pediatric;  Surgeon: Sheree Chen III., MD;  Location: [facility] CATH LAB;  Service: Cardiology;;     Family History   Problem Relation Name Age of Onset    Diabetes Mother      Heart attack Father      No Known Problems Sister      No Known Problems Sister      No Known Problems Sister       Social History     Socioeconomic History    Marital status:    Tobacco Use    Smoking status: Never     Passive exposure: Never    Smokeless tobacco: Current     Types: Snuff, Chew     Social Determinants of Health     Financial Resource Strain: Low Risk  (5/8/2024)    Overall Financial Resource Strain (CARDIA)     Difficulty of Paying  Living Expenses: Not very hard   Food Insecurity: No Food Insecurity (5/8/2024)    Hunger Vital Sign     Worried About Running Out of Food in the Last Year: Never true     Ran Out of Food in the Last Year: Never true   Transportation Needs: No Transportation Needs (5/8/2024)    PRAPARE - Transportation     Lack of Transportation (Medical): No     Lack of Transportation (Non-Medical): No   Physical Activity: Insufficiently Active (5/8/2024)    Exercise Vital Sign     Days of Exercise per Week: 2 days     Minutes of Exercise per Session: 10 min   Stress: No Stress Concern Present (5/8/2024)    [employer] [place] [employer] - Occupational Stress Questionnaire     Feeling of Stress : Only a little   Housing Stability: Low Risk  (9/29/2023)    Housing Stability Vital Sign     Unable to Pay for Housing in the Last Year: No     Number of Places Lived in the Last Year: 1     Unstable Housing in the Last Year: No     Current Outpatient Medications on File Prior to Visit   Medication Sig Dispense Refill    ALPRAZolam (XANAX) 0.5 MG tablet Take 0.5 mg by mouth 3 (three) times daily. Take evening before procedure      amiodarone (PACERONE) 100 MG Tab Take 100 mg by mouth every other day.      aspirin 81 MG Chew Take 1 tablet (81 mg total) by mouth once daily. 90 tablet 3    clopidogreL (PLAVIX) 75 mg tablet TAKE 1 TABLET(75 MG) BY MOUTH EVERY DAY 30 tablet 11    furosemide (LASIX) 20 MG tablet Daily as needed for weight gain 2-3 lb in 24 hours, shortness of breath, swelling (Patient taking differently: Take 20 mg by mouth daily as needed. Daily as needed for weight gain 2-3 lb in 24 hours, shortness of breath, swelling) 30 tablet 11    sacubitriL-valsartan (ENTRESTO) 49-51 mg per tablet Take 1 tablet by mouth 2 (two) times daily. 60 tablet 10    spironolactone (ALDACTONE) 25 MG tablet Take 1 tablet (25 mg total) by mouth 2 (two) times daily. 60 tablet 11    carvediloL (COREG) 12.5 MG tablet Take 1 tablet (12.5 mg  "total) by mouth 2 (two) times daily with meals. 60 tablet 11    [DISCONTINUED] amiodarone (PACERONE) 200 MG Tab Take 200 mg by mouth once daily. 200mg daily Mon, Wed, Friday   100mg every other day (Patient not taking: Reported on 5/8/2024)      [DISCONTINUED] cetirizine (ZYRTEC) 10 MG tablet Take 10 mg by mouth daily as needed. (Patient not taking: Reported on 5/8/2024)      [DISCONTINUED] metoprolol tartrate (LOPRESSOR) 25 MG tablet Take 25 mg by mouth 2 (two) times daily.      [DISCONTINUED] valsartan (DIOVAN) 160 MG tablet Take 160 mg by mouth once daily.       No current facility-administered medications on file prior to visit.     Review of patient's allergies indicates:  No Known Allergies     Vitals:    05/08/24 1100   BP: 112/65   BP Location: Right arm   Patient Position: Sitting   Pulse: 71   SpO2: 99%   Weight: 70.2 kg (154 lb 12.2 oz)   Height: 5' 9" (1.753 m)     In general, he is a very healthy-appearing nondysmorphic male in no apparent distress.  The eyes, nares, and oropharynx are clear.  Eyelids and conjunctiva are normal without drainage or erythema.  Pupils equal and round bilaterally.  The head is normocephalic and atraumatic.  The neck is supple without jugular venous distention or thyroid enlargement.  The lungs are clear to auscultation bilaterally.  Well-healed median sternotomy incision noted.  No evidence of thoracotomy incision.  First heart sound is normal, 2nd heart sound is fixed and split.  I do not hear a murmur.  The abdominal exam is benign without hepatosplenomegaly, tenderness, or distention.  Pulses are normal in all 4 extremities with brisk capillary refill and no clubbing, cyanosis, or edema.  No rashes are noted.    I personally reviewed the following tests performed today and my interpretation follows:    Results for orders placed during the hospital encounter of 05/08/24  Echo  IMPRESSION:  Qualitative impression of at least moderate dilation of the right atrium.  Images " consistent with defibrillator lead crossing tricuspid valve with mild insufficiency.  Qualitative impression of at least moderately dilated right ventricle with normal systolic function.  Right ventricular pressure estimated 26 mmHg above right atrial pressure from well defined TR doppler profile.  Echodensity consistent with patch repair of ventricular septal defect and malalignment of the ventricular septum with no residual shunt demonstrated.  Imaging consistent with stent mounted percutaneous valve in pulmonary position with peak velocity <1.9 m/sec, mean gradient <8 mm Hg and trivial insufficiency.  Normal-sized main and confluent proximal right pulmonary arteries.  Qualitative impression of mild dilation of the left atrium.  Qualitative impression of normal left ventricular size and structure.  Very flattened septal motion with reasonable movement of the LV free wall, SF = 30% and EF estimated 60 -65% from apical views.  Global left ventricular longitudinal strain decreased- peak average measured -15.8%.  There is a trileaflet aortic valve with large annulus, peak velocity <1 m/sec and trivial to mild insufficiency.  Aortic dimensions:  Sinuses of Valsalva = 35 mm.  ST junction              = 31 mm.  Ascending aorta      = 34 mm.  Qualitative impression of mildly dilated ascending aorta and otherwise normal aortic arch.  There is no evidence for coarctation.  No pericardial effusion.    Thank you for referring this patient to our clinic.  Please call with any questions.    Sincerely,        John Galvan MD  Pediatric Cardiology  Adult Congenital Heart Disease  Pediatric Heart Failure and Transplantation  [facility] [address] [place]  [address]  [phone]

## 2024-05-09 ENCOUNTER — PATIENT MESSAGE (OUTPATIENT)
Dept: CARDIOLOGY | Facility: CLINIC | Age: 54
End: 2024-05-09
Payer: COMMERCIAL

## 2024-07-24 ENCOUNTER — OFFICE VISIT (OUTPATIENT)
Dept: UROLOGY | Facility: CLINIC | Age: 54
End: 2024-07-24
Payer: COMMERCIAL

## 2024-07-24 VITALS
BODY MASS INDEX: 22.83 KG/M2 | HEIGHT: 69 IN | WEIGHT: 154.13 LBS | DIASTOLIC BLOOD PRESSURE: 75 MMHG | SYSTOLIC BLOOD PRESSURE: 126 MMHG | HEART RATE: 60 BPM

## 2024-07-24 DIAGNOSIS — R31.0 GROSS HEMATURIA: Primary | ICD-10-CM

## 2024-07-24 LAB
BILIRUB UR QL STRIP: NEGATIVE
CLARITY UR REFRACT.AUTO: CLEAR
COLOR UR AUTO: COLORLESS
GLUCOSE UR QL STRIP: NEGATIVE
HGB UR QL STRIP: NEGATIVE
KETONES UR QL STRIP: NEGATIVE
LEUKOCYTE ESTERASE UR QL STRIP: NEGATIVE
NITRITE UR QL STRIP: NEGATIVE
PH UR STRIP: 5 [PH] (ref 5–8)
PROT UR QL STRIP: NEGATIVE
SP GR UR STRIP: 1.01 (ref 1–1.03)
URN SPEC COLLECT METH UR: ABNORMAL

## 2024-07-24 PROCEDURE — 99999 PR PBB SHADOW E&M-EST. PATIENT-LVL IV: CPT | Mod: PBBFAC,,, | Performed by: NURSE PRACTITIONER

## 2024-07-24 PROCEDURE — 1159F MED LIST DOCD IN RCRD: CPT | Mod: CPTII,S$GLB,, | Performed by: NURSE PRACTITIONER

## 2024-07-24 PROCEDURE — 99204 OFFICE O/P NEW MOD 45 MIN: CPT | Mod: S$GLB,,, | Performed by: NURSE PRACTITIONER

## 2024-07-24 PROCEDURE — 87086 URINE CULTURE/COLONY COUNT: CPT | Performed by: NURSE PRACTITIONER

## 2024-07-24 PROCEDURE — 81003 URINALYSIS AUTO W/O SCOPE: CPT | Performed by: NURSE PRACTITIONER

## 2024-07-24 PROCEDURE — 1160F RVW MEDS BY RX/DR IN RCRD: CPT | Mod: CPTII,S$GLB,, | Performed by: NURSE PRACTITIONER

## 2024-07-24 PROCEDURE — 4010F ACE/ARB THERAPY RXD/TAKEN: CPT | Mod: CPTII,S$GLB,, | Performed by: NURSE PRACTITIONER

## 2024-07-24 PROCEDURE — 3078F DIAST BP <80 MM HG: CPT | Mod: CPTII,S$GLB,, | Performed by: NURSE PRACTITIONER

## 2024-07-24 PROCEDURE — 3008F BODY MASS INDEX DOCD: CPT | Mod: CPTII,S$GLB,, | Performed by: NURSE PRACTITIONER

## 2024-07-24 PROCEDURE — 87088 URINE BACTERIA CULTURE: CPT | Performed by: NURSE PRACTITIONER

## 2024-07-24 PROCEDURE — 3074F SYST BP LT 130 MM HG: CPT | Mod: CPTII,S$GLB,, | Performed by: NURSE PRACTITIONER

## 2024-07-24 NOTE — PATIENT INSTRUCTIONS
U/A and urine cx   CT urogram (next available).   PSA, total and free today  Serum creatinine today  Follow-up pending lab and imaging results.

## 2024-07-24 NOTE — PROGRESS NOTES
Subjective:       Patient ID: Cristobal Kidd III is a 53 y.o. male.    Chief Complaint: Hematuria    Patient is new to me. He is a 52 yo WM who is here today for evaluation of gross hematuria. Patient reports being at work Friday and feeling a wet spot in his underwear while talking to a customer. Eventually, he went to the restroom to see what was going on because it felt as if the wet spot in his underwear was getting bigger. Once in the restroom, he noticed his underwear was covered with a large amount of dark blood. He left work early and made it home. More blood was noted with blood clots. Patient reports never feeling the sensation to void, denies pain, any known injury, or hx of nephrolithiasis. He was taking Plavix, which was discontinued May 2024. He is currently taking a daily baby aspirin. Patient chews tobacco (approximately 30 years). He denies personal hx of BPH and family hx of prostate cancer. He is here today with his wife (Rhea).     Hematuria  This is a new problem. Episode onset: 7/19/2024. The problem has been resolved since onset. He describes the hematuria as gross hematuria. His pain is at a severity of 0/10. He is experiencing no pain. He describes his urine color as yellow. Irritative symptoms do not include frequency, nocturia or urgency. Pertinent negatives include no abdominal pain, chills, dysuria, facial swelling, fever, flank pain, genital pain, hesitancy, inability to urinate, nausea or vomiting. He is sexually active. His past medical history is significant for hypertension and tobacco use (chew). There is no history of  trauma, kidney stones or UTI.     Review of Systems   Constitutional:  Negative for chills and fever.   HENT:  Negative for facial swelling.    Gastrointestinal:  Negative for abdominal pain, change in bowel habit, nausea and vomiting.   Genitourinary:  Positive for hematuria (gross). Negative for decreased urine volume, difficulty urinating, discharge, dysuria,  "flank pain, frequency, hesitancy, nocturia, penile pain, penile swelling, scrotal swelling, testicular pain and urgency.   Neurological: Negative.    Psychiatric/Behavioral: Negative.           Objective:      Physical Exam  Vitals and nursing note reviewed.   Constitutional:       General: He is not in acute distress.     Appearance: He is well-developed and normal weight. He is not ill-appearing.   HENT:      Head: Normocephalic and atraumatic.   Eyes:      Pupils: Pupils are equal, round, and reactive to light.   Cardiovascular:      Rate and Rhythm: Normal rate.   Pulmonary:      Effort: Pulmonary effort is normal. No respiratory distress.   Abdominal:      Palpations: Abdomen is soft.      Tenderness: There is no abdominal tenderness.   Musculoskeletal:         General: Normal range of motion.      Cervical back: Normal range of motion.   Skin:     General: Skin is warm and dry.   Neurological:      Mental Status: He is alert and oriented to person, place, and time.      Coordination: Coordination normal.   Psychiatric:         Mood and Affect: Mood normal.         Behavior: Behavior normal.         Thought Content: Thought content normal.         Judgment: Judgment normal.         Assessment:       Problem List Items Addressed This Visit    None  Visit Diagnoses       Gross hematuria    -  Primary    Relevant Orders    CT Urogram Abd Pelvis W WO    Urine culture    Urinalysis    PSA, Total and Free    Creatinine, Serum            Plan:           Cristobal "Jory" was seen today for hematuria.    Diagnoses and all orders for this visit:    Gross hematuria  -     CT Urogram Abd Pelvis W WO; Future  -     Urine culture  -     Urinalysis  -     PSA, Total and Free; Future  -     Creatinine, Serum; Future    Follow-up pending lab and imaging results.    Tonja Dyer DNP    "

## 2024-07-25 ENCOUNTER — TELEPHONE (OUTPATIENT)
Dept: UROLOGY | Facility: CLINIC | Age: 54
End: 2024-07-25
Payer: COMMERCIAL

## 2024-07-25 LAB — BACTERIA UR CULT: ABNORMAL

## 2024-07-25 NOTE — TELEPHONE ENCOUNTER
----- Message from Tonja Dyer NP sent at 7/25/2024  1:52 PM CDT -----  Please inform patient via telephone that his PSA was normal at 0.60 ng/mL. Recommended to repeat in 1 year.

## 2024-07-26 ENCOUNTER — TELEPHONE (OUTPATIENT)
Dept: UROLOGY | Facility: CLINIC | Age: 54
End: 2024-07-26
Payer: COMMERCIAL

## 2024-07-26 DIAGNOSIS — R31.0 GROSS HEMATURIA: ICD-10-CM

## 2024-07-26 DIAGNOSIS — N30.01 ACUTE CYSTITIS WITH HEMATURIA: Primary | ICD-10-CM

## 2024-07-26 RX ORDER — SULFAMETHOXAZOLE AND TRIMETHOPRIM 800; 160 MG/1; MG/1
1 TABLET ORAL 2 TIMES DAILY
Qty: 20 TABLET | Refills: 0 | Status: SHIPPED | OUTPATIENT
Start: 2024-07-26 | End: 2024-08-05

## 2024-07-26 NOTE — TELEPHONE ENCOUNTER
----- Message from Tonja Dyer DNP sent at 7/26/2024  9:13 AM CDT -----  Please inform patient via telephone that his urine cx came back positive for a UTI. Script for Bactrim DS sent to Di. Repeat urine cx after completion of antibiotic therapy for test of cure. Order placed. Continue to move forward with CT urogram as well.

## 2024-07-29 ENCOUNTER — HOSPITAL ENCOUNTER (OUTPATIENT)
Dept: RADIOLOGY | Facility: HOSPITAL | Age: 54
Discharge: HOME | End: 2024-07-29
Attending: NURSE PRACTITIONER
Payer: COMMERCIAL

## 2024-07-29 ENCOUNTER — TELEPHONE (OUTPATIENT)
Dept: UROLOGY | Facility: CLINIC | Age: 54
End: 2024-07-29
Payer: COMMERCIAL

## 2024-07-29 DIAGNOSIS — R31.0 GROSS HEMATURIA: ICD-10-CM

## 2024-07-29 DIAGNOSIS — D17.71 ANGIOMYOLIPOMA OF RIGHT KIDNEY: Primary | ICD-10-CM

## 2024-07-29 PROCEDURE — 74178 CT ABD&PLV WO CNTR FLWD CNTR: CPT | Mod: 26,,, | Performed by: RADIOLOGY

## 2024-07-29 PROCEDURE — 25500020 PHARM REV CODE 255: Performed by: NURSE PRACTITIONER

## 2024-07-29 PROCEDURE — 74178 CT ABD&PLV WO CNTR FLWD CNTR: CPT | Mod: TC

## 2024-07-29 RX ADMIN — IOHEXOL 150 ML: 350 INJECTION, SOLUTION INTRAVENOUS at 12:07

## 2024-07-29 NOTE — PROGRESS NOTES
Diagnoses and all orders for this visit:    Angiomyolipoma of right kidney  -     US Retroperitoneal Complete; Future in 1 year.     YCollins Dyer, DNP

## 2024-07-29 NOTE — TELEPHONE ENCOUNTER
----- Message from Tonja Dyer DNP sent at 7/29/2024  1:00 PM CDT -----  Please inform patient via telephone that his CT urogram was negative for urinary tract stones or renal mass. A small focus of fat in the right kidney likely represents a small angiomyolipoma (benign fatty tumor) versus prominent renal fat. Will continue to monitor with U/S in 1 year. Order placed. His hematuria appears to be related to his UTI. If he experiences blood in his urine again, we will have further evaluate with a cystoscopy. Encourage patient to repeat urine cx after completion of antibiotic therapy.

## 2025-05-06 ENCOUNTER — TELEPHONE (OUTPATIENT)
Dept: CARDIOLOGY | Facility: CLINIC | Age: 55
End: 2025-05-06
Payer: COMMERCIAL

## 2025-05-06 ENCOUNTER — PATIENT MESSAGE (OUTPATIENT)
Dept: CARDIOLOGY | Facility: CLINIC | Age: 55
End: 2025-05-06
Payer: COMMERCIAL

## 2025-05-06 ENCOUNTER — DOCUMENTATION ONLY (OUTPATIENT)
Dept: CARDIOLOGY | Facility: HOSPITAL | Age: 55
End: 2025-05-06
Payer: COMMERCIAL

## 2025-05-06 DIAGNOSIS — Z95.2 PULMONARY VALVE REPLACED: ICD-10-CM

## 2025-05-06 DIAGNOSIS — I50.42 CHF (CONGESTIVE HEART FAILURE), NYHA CLASS II, CHRONIC, COMBINED: ICD-10-CM

## 2025-05-06 DIAGNOSIS — Q24.9 CONGENITAL MALFORMATION OF HEART, UNSPECIFIED: Primary | ICD-10-CM

## 2025-05-06 DIAGNOSIS — Z87.74 TETRALOGY OF FALLOT S/P REPAIR: ICD-10-CM

## 2025-05-06 DIAGNOSIS — I47.20 VENTRICULAR TACHYCARDIA: Primary | ICD-10-CM

## 2025-05-06 DIAGNOSIS — Q24.9 ADULT CONGENITAL HEART DISEASE: ICD-10-CM

## 2025-05-06 DIAGNOSIS — I37.2 NONRHEUMATIC PULMONARY VALVE STENOSIS WITH INSUFFICIENCY: ICD-10-CM

## 2025-05-06 NOTE — TELEPHONE ENCOUNTER
Appt moved to May 15 start time 8 AM. Patient verbalized understanding all information provided  ----- Message from Evans sent at 5/6/2025 10:06 AM CDT -----  Contact: pt @  [phone]  Patient is returning a phone call. Who left a message for the patient: Grace Fermin, RN Does patient know what this is regarding:  test Would you like a call back, or a response through your [facility] portal?: call back Comments:

## 2025-05-06 NOTE — PROGRESS NOTES
Received a verbal message from KATIUSKA Edwards in the Adult [facility] clinic in relation to this patient being scheduled for a MRI and has a Medtronic  ICD.  Please see information below as it relates to patient's Device being MRI compatible/conditional.  To meet protocol the Pacemaker/ICD must have been implanted no less than 6 weeks prior to scheduled date of Scan.  ICD/PPM and leads must be from same .  MRI informed ordering MD must input a Cardiac Device Check in clinic and hospital order, patient must have an xray within 6 months or less prior to MRI reprogramming.  Chest xray to be reviewed by Radiologist.    MRI scheduled on (5/15/25 @ 8:00 am).  Medtronic Rep (Megan SLOAN) notified on (5/6/25).  Confirmation received from Rep.

## 2025-05-15 ENCOUNTER — HOSPITAL ENCOUNTER (OUTPATIENT)
Dept: CARDIOLOGY | Facility: CLINIC | Age: 55
Discharge: HOME | End: 2025-05-15
Payer: COMMERCIAL

## 2025-05-15 ENCOUNTER — PATIENT MESSAGE (OUTPATIENT)
Dept: CARDIOLOGY | Facility: CLINIC | Age: 55
End: 2025-05-15

## 2025-05-15 ENCOUNTER — OFFICE VISIT (OUTPATIENT)
Dept: CARDIOLOGY | Facility: CLINIC | Age: 55
End: 2025-05-15
Payer: COMMERCIAL

## 2025-05-15 ENCOUNTER — HOSPITAL ENCOUNTER (OUTPATIENT)
Dept: CARDIOLOGY | Facility: HOSPITAL | Age: 55
Discharge: HOME | End: 2025-05-15
Attending: PEDIATRICS
Payer: COMMERCIAL

## 2025-05-15 ENCOUNTER — HOSPITAL ENCOUNTER (OUTPATIENT)
Dept: RADIOLOGY | Facility: HOSPITAL | Age: 55
Discharge: HOME | End: 2025-05-15
Attending: PEDIATRICS
Payer: COMMERCIAL

## 2025-05-15 VITALS
WEIGHT: 157.63 LBS | HEIGHT: 69 IN | OXYGEN SATURATION: 99 % | HEART RATE: 66 BPM | DIASTOLIC BLOOD PRESSURE: 65 MMHG | BODY MASS INDEX: 23.35 KG/M2 | BODY MASS INDEX: 22.81 KG/M2 | WEIGHT: 154 LBS | HEIGHT: 69 IN | SYSTOLIC BLOOD PRESSURE: 119 MMHG

## 2025-05-15 DIAGNOSIS — I37.2 NONRHEUMATIC PULMONARY VALVE STENOSIS WITH INSUFFICIENCY: ICD-10-CM

## 2025-05-15 DIAGNOSIS — Q24.9 CONGENITAL MALFORMATION OF HEART, UNSPECIFIED: Primary | ICD-10-CM

## 2025-05-15 DIAGNOSIS — Q24.9 CONGENITAL MALFORMATION OF HEART, UNSPECIFIED: ICD-10-CM

## 2025-05-15 DIAGNOSIS — Q24.9 ADULT CONGENITAL HEART DISEASE: ICD-10-CM

## 2025-05-15 DIAGNOSIS — Z87.74 TETRALOGY OF FALLOT S/P REPAIR: ICD-10-CM

## 2025-05-15 DIAGNOSIS — Z95.2 PULMONARY VALVE REPLACED: ICD-10-CM

## 2025-05-15 DIAGNOSIS — I45.10 RBBB: Primary | ICD-10-CM

## 2025-05-15 LAB
AORTIC SIZE INDEX (SOV): 2 CM/M2
AORTIC SIZE INDEX: 1.7 CM/M2
ASCENDING AORTA: 3.1 CM
AV AREA BY CONTINUOUS VTI: 3.1 CM2
AV INDEX (PROSTH): 0.49
AV LVOT MEAN GRADIENT: 1 MMHG
AV LVOT PEAK GRADIENT: 1 MMHG
AV MEAN GRADIENT: 5 MMHG
AV PEAK GRADIENT: 8 MMHG
AV VALVE AREA BY VELOCITY RATIO: 2.2 CM²
AV VALVE AREA: 3 CM2
AV VELOCITY RATIO: 0.36
BSA FOR ECHO PROCEDURE: 1.84 M2
CV ECHO LV RWT: 0.32 CM
DOP CALC AO PEAK VEL: 1.4 M/S
DOP CALC AO VTI: 26.9 CM
DOP CALC LVOT AREA: 6.2 CM2
DOP CALC LVOT DIAMETER: 2.8 CM
DOP CALC LVOT PEAK VEL: 0.5 M/S
DOP CALC LVOT STROKE VOLUME: 81.2 CM3
DOP CALC RVOT VTI: 23.97 CM
DOP CALCLVOT PEAK VEL VTI: 13.2 CM
E WAVE DECELERATION TIME: 201 MS
E/A RATIO: 1.21
E/E' RATIO: 6 M/S
ECHO EF ESTIMATED: 46 %
ECHO LV POSTERIOR WALL: 0.8 CM (ref 0.6–1.1)
FRACTIONAL SHORTENING: 22 % (ref 28–44)
INTERVENTRICULAR SEPTUM: 0.8 CM (ref 0.6–1.1)
LA MAJOR: 4.6 CM
LA MINOR: 4.3 CM
LA WIDTH: 2.9 CM
LEFT ATRIUM SIZE: 4.2 CM
LEFT ATRIUM VOLUME INDEX MOD: 17 ML/M2
LEFT ATRIUM VOLUME INDEX: 25 ML/M2
LEFT ATRIUM VOLUME MOD: 31 ML
LEFT ATRIUM VOLUME: 46 CM3
LEFT INTERNAL DIMENSION IN SYSTOLE: 3.9 CM (ref 2.1–4)
LEFT VENTRICLE DIASTOLIC VOLUME INDEX: 64.86 ML/M2
LEFT VENTRICLE DIASTOLIC VOLUME: 120 ML
LEFT VENTRICLE MASS INDEX: 73.4 G/M2
LEFT VENTRICLE SYSTOLIC VOLUME INDEX: 35.1 ML/M2
LEFT VENTRICLE SYSTOLIC VOLUME: 65 ML
LEFT VENTRICULAR INTERNAL DIMENSION IN DIASTOLE: 5 CM (ref 3.5–6)
LEFT VENTRICULAR MASS: 135.8 G
LV LATERAL E/E' RATIO: 5.7
LV SEPTAL E/E' RATIO: 5.7
MV PEAK A VEL: 0.47 M/S
MV PEAK E VEL: 0.57 M/S
OHS CV RV/LV RATIO: 0.96 CM
OHS QRS DURATION: 172 MS
OHS QTC CALCULATION: 503 MS
PISA TR MAX VEL: 3.1 M/S
PV MEAN GRADIENT: 8 MMHG
PV MV: 1.32 M/S
PV PEAK GRADIENT: 14 MMHG
PV PEAK VELOCITY: 1.84 M/S
RA MAJOR: 5.48 CM
RA WIDTH: 4.03 CM
RIGHT ATRIAL AREA: 21.7 CM2
RIGHT VENTRICLE DIASTOLIC BASEL DIMENSION: 4.8 CM
RV TISSUE DOPPLER FREE WALL SYSTOLIC VELOCITY 1 (APICAL 4 CHAMBER VIEW): 7.81 CM/S
SINUS: 3.67 CM
STJ: 3.1 CM
TDI LATERAL: 0.1 M/S
TDI SEPTAL: 0.1 M/S
TDI: 0.1 M/S
TRICUSPID ANNULAR PLANE SYSTOLIC EXCURSION: 1.2 CM
TV PEAK GRADIENT: 37 MMHG
Z-SCORE OF LEFT VENTRICULAR DIMENSION IN END DIASTOLE: -0.27
Z-SCORE OF LEFT VENTRICULAR DIMENSION IN END SYSTOLE: 1.63

## 2025-05-15 PROCEDURE — 1159F MED LIST DOCD IN RCRD: CPT | Mod: CPTII,S$GLB,, | Performed by: PEDIATRICS

## 2025-05-15 PROCEDURE — 3008F BODY MASS INDEX DOCD: CPT | Mod: CPTII,S$GLB,, | Performed by: PEDIATRICS

## 2025-05-15 PROCEDURE — 93005 ELECTROCARDIOGRAM TRACING: CPT | Mod: S$GLB,,, | Performed by: PEDIATRICS

## 2025-05-15 PROCEDURE — 3078F DIAST BP <80 MM HG: CPT | Mod: CPTII,S$GLB,, | Performed by: PEDIATRICS

## 2025-05-15 PROCEDURE — 93320 DOPPLER ECHO COMPLETE: CPT

## 2025-05-15 PROCEDURE — 75565 CARD MRI VELOC FLOW MAPPING: CPT | Mod: TC

## 2025-05-15 PROCEDURE — 93010 ELECTROCARDIOGRAM REPORT: CPT | Mod: S$GLB,,, | Performed by: INTERNAL MEDICINE

## 2025-05-15 PROCEDURE — 99214 OFFICE O/P EST MOD 30 MIN: CPT | Mod: S$GLB,,, | Performed by: PEDIATRICS

## 2025-05-15 PROCEDURE — 93325 DOPPLER ECHO COLOR FLOW MAPG: CPT | Mod: 26,,, | Performed by: PEDIATRICS

## 2025-05-15 PROCEDURE — 99999 PR PBB SHADOW E&M-EST. PATIENT-LVL III: CPT | Mod: PBBFAC,,, | Performed by: PEDIATRICS

## 2025-05-15 PROCEDURE — 4010F ACE/ARB THERAPY RXD/TAKEN: CPT | Mod: CPTII,S$GLB,, | Performed by: PEDIATRICS

## 2025-05-15 PROCEDURE — A9585 GADOBUTROL INJECTION: HCPCS | Performed by: PEDIATRICS

## 2025-05-15 PROCEDURE — 75561 CARDIAC MRI FOR MORPH W/DYE: CPT | Mod: 26,,, | Performed by: PEDIATRICS

## 2025-05-15 PROCEDURE — 3074F SYST BP LT 130 MM HG: CPT | Mod: CPTII,S$GLB,, | Performed by: PEDIATRICS

## 2025-05-15 PROCEDURE — 93320 DOPPLER ECHO COMPLETE: CPT | Mod: 26,,, | Performed by: PEDIATRICS

## 2025-05-15 PROCEDURE — 25500020 PHARM REV CODE 255: Performed by: PEDIATRICS

## 2025-05-15 PROCEDURE — 75565 CARD MRI VELOC FLOW MAPPING: CPT | Mod: 26,,, | Performed by: PEDIATRICS

## 2025-05-15 PROCEDURE — 93303 ECHO TRANSTHORACIC: CPT | Mod: 26,,, | Performed by: PEDIATRICS

## 2025-05-15 PROCEDURE — 71046 X-RAY EXAM CHEST 2 VIEWS: CPT | Mod: 26,,, | Performed by: RADIOLOGY

## 2025-05-15 PROCEDURE — 71046 X-RAY EXAM CHEST 2 VIEWS: CPT | Mod: TC,FY

## 2025-05-15 RX ORDER — GADOBUTROL 604.72 MG/ML
10 INJECTION INTRAVENOUS
Status: COMPLETED | OUTPATIENT
Start: 2025-05-15 | End: 2025-05-15

## 2025-05-15 RX ORDER — SACUBITRIL AND VALSARTAN 24; 26 MG/1; MG/1
1 TABLET, FILM COATED ORAL 2 TIMES DAILY
COMMUNITY

## 2025-05-15 RX ORDER — ATORVASTATIN CALCIUM 20 MG/1
20 TABLET, FILM COATED ORAL DAILY
COMMUNITY

## 2025-05-15 RX ADMIN — GADOBUTROL 10 ML: 604.72 INJECTION INTRAVENOUS at 10:05

## 2025-05-15 NOTE — NURSING
Cardiac MRI complete at this time, patient tolerated MRI, heart rate 84, O2 sat. 92% on RA, cardiac ICD placed in normal operating mode per [employer] Rep., patient discharged in NAD.

## 2025-05-15 NOTE — PROGRESS NOTES
05/15/2025     re:Cristobal Kidd III  :1970    Johnnie Ackerman MD  [address] [facility] [address]    Dr. Tejinder Encarnacion  [facility]     Dear Doctors:    Cristobal Kidd III is a 54 y.o. male seen in my [facility] clinic today for evaluation of tetralogy of Fallot.  To summarize his diagnoses are as follow:  1. History of tetralogy of Fallot status post 2 surgeries at [facility], 1st at about 3 years of age, 2nd at about 7 years of age.  Both performed via median sternotomy.    - severe pulmonary valve insufficiency with severe right ventricular enlargement and preserved function now status post 25 mm Butler valve implantation 2023 with excellent result   - no stenosis or insufficiency of the new pulmonary valve   - dramatic improvement in RV size (now normal with normal function)   - likely moderate Tricuspid insufficiency with ICD crossing the TV  - significant proximal LPA narrowing   2. History of ventricular tachycardia status post Medtronic ICD implantation  - device and leads are MRI compatible.  Generator model number is [id number], pacer lead [id number], ICD lead [id number].   - now status post ablation procedure 2023 by Dr. Albert  - followed by Dr. Encarnacion  3. Borderline pulmonary hypertension in the right pulmonary artery exacerbated by severe left pulmonary artery stenosis  4. Reported history of nonischemic cardiomyopathy  - currently with normal left ventricular function.  LVEF on MRI improved from 44 to 55% on MRI after pulmonary valve replacement.  5. Hypertension  6. Family history of coronary artery disease, father with history  - normal coronary arteries on catheterization 2023  7. Mild aortic insufficiency with mild aortic root enlargement, typical for tetralogy    To summarize, my recommendations are as follows:  Continue aspirin indefinitely.    Continue follow-up with Dr. Encarnacion.  I would be  fine with backing off further on meds (stopping entresto) given his normal LVEF and orthostatic hypotension.  At a minimum, Follow-up with me in 1 year with repeat echocardiogram, EKG.    Discuss with cath team regarding LPA - likely needs LPA dilation/stent  I would recommend SBE prophylaxis before dental work.    Discussion:  I am thrilled with the results of his pulmonary valve replacement.  He has had a dramatic improvement in his right ventricular size with preserved right ventricular function.  As is sometimes the case, we have seen a significant improvement in his left ventricular ejection fraction as well, and it now measures 55%.  His blood pressure was fine in clinic today, and it sounds like he has some orthostatic hypotension.  I would be fine with backing off more on his medications, considering discontinuing the Entresto, but I will leave that up to his primary cardiology team.      He continues to have severe proximal left pulmonary artery stenosis.  On his 2023 cardiac MRI, 88% of the blood flow went to the right lung.  He continues with significant tricuspid insufficiency.  He would benefit from intervention on that left pulmonary artery, and I am hopeful that he will be a candidate for catheter based intervention.  I have asked our cath team to review.  We should be getting a cardiac catheterization set up in the near future.    History of present illness:  He continues to do well.  He definitely has improved exercise tolerance compared to before his pulmonary valve, but he does still have some dyspnea on exertion such as during yd work.  He has occasional dizziness when he stands up.  No edema.  No syncope.  No palpitations.  No chest pain.    MRI today:  Right atrial enlargement.   Difficult to assess TR in setting of right heart ICD lead  Normal right ventricular volumes. (RVEDV 92 cc/m2 for BSA, RVESV 39 cc/m2 for BSA). RVEF 43 %. There is artifact from the ICD affecting the superior/anterior  RV limiting accuracy.  Normal left ventricular volume with LVEF 55 %.  Dilated and thin proximal RVOT consistent with patch. Well-seated pulmonary valve prosthesis with no significant insufficiency and top normal velocity <2m/sec.    Dilated MPA. Dilated RPA with severe discrete proximal LPA stenosis.   Mildly enlarged aortic root and ascending aorta.   Delayed enhancement: There is evidence of myocardial fibrosis in the RVOT (likely patch) and at the RV insertion point (frequent in tetralogy of Fallot.    When compared to prior MRI, pulmonary valve prosthesis in place and functioning well. RV has significantly decreased in size. LV function has improved.     Cath 9/28/23:  1) Repaired tetralogy of Fallot with free pulmonary insufficiency and RV enlargement.  2) Severe proximal LPA stenosis  3) Normal PA pressure, cardiac output, and vascular resistance calculations.  4) Successful TPV 25 Jackson valve implantation (gradient 5mmHg, no insufficiency)  5) Normal coronary angiography      MRI 6/1/23:  Biatrial enlargement.   Difficult to assess TR in setting of right heart ICD lead  Increased right ventricular volumes. (RVEDV 168 cc/m2 for BSA, RVESV 88 cc/m2 for BSA).  RVEF 48 %. There is artifact from the ICD affecting the superior/anterior RV limiting accuracy.  Normal left ventricular volume with LVEF 44 %.  Dilated and thin proximal RVOT consistent with patch. There is pulmonary valve tissue in the RVOT. Unrestricted PI with regurgitation fraction of 44%. Increased velocity to 3.1m/sec.   Dilated MPA. Dilated RPA with severe discrete proximal LPA stenosis. Discrepant pulmonary blood flow with 88% to RPA.   Mildly enlarged aortic root.     He does have a cousin with a history of tetralogy.  Otherwise, family history is negative for congenital heart disease.    I reviewed medical records from [facility].  Patient seen there September 23, 2022.  In clinic his blood  pressure was 130/79, heart rate 60, BMI 23.5.  A high-pitched harsh mid systolic murmur was present with radiation to the neck.  Patient was on Aldactone 25 mg twice a day, amiodarone, carvedilol 6.25 mg twice a day, Entresto 49/51 mg twice a day, Lasix 20 mg as needed, and Zyrtec.  Patient had blood work performed August 23, 2022.  NT pro BNP elevated at 252 with 121 being the upper limit of normal.  Basic metabolic panel normal with creatinine 1.2, potassium 5.  Sodium 138.  Calcium 10.  Blood work July 21, 2022:  Total cholesterol 186, HDL 39, , non HDL cholesterol elevated at 147.  Albumin 4.3.  Bilirubin upper limit of normal at 1.8.  AST and ALT normal.     Transthoracic echocardiogram September 23, 2022 with normal left ventricular systolic function, ejection fraction 55%.  No evidence of diastolic dysfunction.  Mild asymmetric septal left ventricular hypertrophy present.  Global longitudinal strain-16.8%.  Mild left atrial enlargement.  Reportedly with mild to moderate pulmonary insufficiency and mild-to-moderate tricuspid insufficiency with estimated pulmonary artery systolic pressure 55 mmHg.  Peak velocity across the pulmonary valve reported as 0.5 m/sec.  Right ventricular function reported as normal.  TAPSE 2.2 cm.  Aortic root 3.1 cm.  Aortic valve with mild aortic insufficiency.    EKG performed September 23, 2022 with AV sequential pacing.    Patient had a chest CTA August 16, 2022 at [facility]:  1. No evidence of pulmonary embolism  2. Marked narrowing of the proximal aspect of the left main pulmonary artery and diffuse enlargement of the right pulmonary artery     The review of systems is as noted above. It is otherwise negative for other symptoms related to the general, neurological, psychiatric, endocrine, gastrointestinal, genitourinary, respiratory, dermatologic, musculoskeletal, hematologic, and immunologic systems.    Past Medical History:   Diagnosis Date    Fracture, tibia  and fibula     left History of    Palpitations     Recurrent ventricular tachycardia      Past Surgical History:   Procedure Laterality Date    ABLATION N/A 4/24/2023    Procedure: VT Ablation;  Surgeon: Eric Ablert MD;  Location: [facility] CATH;  Service: Cardiology;  Laterality: N/A;  Pt has an AICD, ops # 13    ANGIOGRAM, CORONARY, PEDIATRIC  9/28/2023    Procedure: Angiogram, Coronary, Pediatric;  Surgeon: Sheree Chen III., MD;  Location: [facility] CATH LAB;  Service: Cardiology;;    ANGIOGRAM, PULMONARY, PEDIATRIC  9/28/2023    Procedure: Angiogram, Pulmonary, Pediatric;  Surgeon: Sheree Chen III., MD;  Location: [facility] CATH LAB;  Service: Cardiology;;    COMBINED RIGHT AND RETROGRADE LEFT HEART CATHETERIZATION FOR CONGENITAL HEART DEFECT N/A 9/28/2023    Procedure: Catheterization, Heart, Combined Right and Retrograde Left, for Congenital Heart Defect;  Surgeon: Sheree Chen III., MD;  Location: [facility] CATH LAB;  Service: Cardiology;  Laterality: N/A;    INSERTION, ICD, DUAL CHAMBER  08/17/2022    REPLACE, PULMONARY, VALVE, PEDIATRIC N/A 9/28/2023    Procedure: Replace, Pulmonary, Valve, Pediatric;  Surgeon: Sheree Chen III., MD;  Location: [facility] CATH LAB;  Service: Cardiology;  Laterality: N/A;    TETRALOGY OF FALLOT REPAIR      VALVULOPLASTY, PULMONARY, PEDIATRIC  9/28/2023    Procedure: Valvuloplasty, Pulmonary, Pediatric;  Surgeon: Sheree Chen III., MD;  Location: [facility] CATH LAB;  Service: Cardiology;;     Family History   Problem Relation Name Age of Onset    Diabetes Mother      Heart attack Father      No Known Problems Sister      No Known Problems Sister      No Known Problems Sister       Social History     Socioeconomic History    Marital status:    Tobacco Use    Smoking status: Never     Passive exposure: Never    Smokeless tobacco: Current     Types: Snuff, Chew     Social Drivers of Health     Financial Resource Strain: Low Risk  (5/8/2024)    Overall Financial Resource  "Strain (CARDIA)     Difficulty of Paying Living Expenses: Not very hard   Food Insecurity: No Food Insecurity (5/8/2024)    Hunger Vital Sign     Worried About Running Out of Food in the Last Year: Never true     Ran Out of Food in the Last Year: Never true   Transportation Needs: No Transportation Needs (5/8/2024)    PRAPARE - Transportation     Lack of Transportation (Medical): No     Lack of Transportation (Non-Medical): No   Physical Activity: Insufficiently Active (5/8/2024)    Exercise Vital Sign     Days of Exercise per Week: 2 days     Minutes of Exercise per Session: 10 min   Stress: No Stress Concern Present (5/8/2024)    [employer] - Occupational Stress Questionnaire     Feeling of Stress : Only a little   Housing Stability: Low Risk  (9/29/2023)    Housing Stability Vital Sign     Unable to Pay for Housing in the Last Year: No     Number of Places Lived in the Last Year: 1     Unstable Housing in the Last Year: No     Medications Ordered Prior to Encounter[1]  Review of patient's allergies indicates:  No Known Allergies     Vitals:    05/15/25 1245   BP: 119/65   BP Location: Left arm   Patient Position: Sitting   Pulse: 66   SpO2: 99%   Weight: 71.5 kg (157 lb 10.1 oz)   Height: 5' 9" (1.753 m)     In general, he is a very healthy-appearing nondysmorphic male in no apparent distress.  The eyes, nares, and oropharynx are clear.  Eyelids and conjunctiva are normal without drainage or erythema.  Pupils equal and round bilaterally.  The head is normocephalic and atraumatic.  The neck is supple without jugular venous distention or thyroid enlargement.  The lungs are clear to auscultation bilaterally.  Well-healed median sternotomy incision noted.  No evidence of thoracotomy incision.  First heart sound is normal, 2nd heart sound is fixed and split.  I do not hear a murmur.  The abdominal exam is benign without hepatosplenomegaly, tenderness, or distention.  Pulses are normal in " all 4 extremities with brisk capillary refill and no clubbing, cyanosis, or edema.  No rashes are noted.    I personally reviewed the following tests performed today and my interpretation follows:    EKG today reveals normal sinus rhythm with a first-degree AV block and a right bundle branch block with QRS duration around 170 milliseconds.      The official echo report is pending.  I reviewed that study in detail.  My interpretation is as follows:   1. No pericardial effusion   2. Normal right ventricular function, no dilation   3. ICD lead crossing the tricuspid valve with likely moderate insufficiency  4. Catheter based pulmonary valve functioning normally with peak velocity 2 m/sec and no insufficiency   5. Stenosis of the left pulmonary artery not well visualized   6. Normal left ventricular size and, by my estimate, low-normal left ventricular systolic function with ejection fraction likely around 55%      Thank you for referring this patient to our clinic.  Please call with any questions.    Sincerely,        John Galvan MD  Pediatric Cardiology  Adult Congenital Heart Disease  Pediatric Heart Failure and Transplantation  [facility] [address] [place]  [address]  [phone]                 [1]   Current Outpatient Medications on File Prior to Visit   Medication Sig Dispense Refill    amiodarone (PACERONE) 100 MG Tab Take 100 mg by mouth twice a week.      aspirin 81 MG Chew Take 1 tablet (81 mg total) by mouth once daily. 90 tablet 3    carvediloL (COREG) 12.5 MG tablet Take 1 tablet (12.5 mg total) by mouth 2 (two) times daily with meals. 60 tablet 11    ENTRESTO 24-26 mg per tablet Take 1 tablet by mouth 2 (two) times daily.      furosemide (LASIX) 20 MG tablet Daily as needed for weight gain 2-3 lb in 24 hours, shortness of breath, swelling (Patient taking differently: Take 20 mg by mouth daily as needed. Daily as needed for weight gain 2-3 lb in 24 hours,  shortness of breath, swelling) 30 tablet 11    spironolactone (ALDACTONE) 25 MG tablet Take 1 tablet (25 mg total) by mouth 2 (two) times daily. 60 tablet 11    atorvastatin (LIPITOR) 20 MG tablet Take 20 mg by mouth once daily.      [DISCONTINUED] ALPRAZolam (XANAX) 0.5 MG tablet Take 0.5 mg by mouth 3 (three) times daily. Take evening before procedure (Patient not taking: Reported on 7/24/2024)      [DISCONTINUED] metoprolol tartrate (LOPRESSOR) 25 MG tablet Take 25 mg by mouth 2 (two) times daily.      [DISCONTINUED] sacubitriL-valsartan (ENTRESTO) 49-51 mg per tablet Take 1 tablet by mouth 2 (two) times daily. (Patient not taking: Reported on 5/15/2025) 60 tablet 10    [DISCONTINUED] valsartan (DIOVAN) 160 MG tablet Take 160 mg by mouth once daily.       No current facility-administered medications on file prior to visit.

## 2025-05-15 NOTE — NURSING
patient arrived for scheduled outpatient Cardiac MRI, patient identification verified X 2, allergies reviewed, patient assisted to MRI table without difficulty, cardiac ICD (dual chamber) placed in MRI safe mode per Medtronic Rep., MRI safe cardiac monitor and pulse ox. applied, heart rate 85, O2 sat. 98% on RA, NAD noted at this time, will continue to monitor patient throughout MRI.

## 2025-05-16 NOTE — TELEPHONE ENCOUNTER
Spoke with patient to schedule cath procedure, agreed to 7/11/25. Pre-cath instructions sent via portal. Dr. Galvan updated at this time.

## 2025-07-02 ENCOUNTER — CARDIOLOGY CONFERENCE (OUTPATIENT)
Dept: CARDIOLOGY | Facility: HOSPITAL | Age: 55
End: 2025-07-02
Payer: COMMERCIAL

## 2025-07-02 NOTE — PROGRESS NOTES
Patient: Cristobal Kidd, [id number], 53 yo, 71 kg, TOF, Cath for LPA stent (TY) 7/11    This patient's plan of care was discussed in the biweekly [facility] conference [facility]. All team members were present, including [facility] cardiologists, congenital cardiac anesthesiologists, CV surgeons, interventional cardiologists and the [facility] , PA, and nurse navigator.     Discussion:  Hoping his LPA can be intervened on in the cath lab. Viewed his cath images from 2023.    Susan Delcid PA-C  Adult Congenital Heart Disease  Pediatric Cardiology

## 2025-07-08 ENCOUNTER — ANESTHESIA EVENT (OUTPATIENT)
Dept: MEDSURG UNIT | Facility: HOSPITAL | Age: 55
End: 2025-07-08
Payer: COMMERCIAL

## 2025-07-10 NOTE — PRE ADMISSION SCREENING
"Verified the following instructions with Mr Corley:    We have scheduled a cardiac catheterization for Cristobal Kidd III   Friday July 11, 2025. You should check in on the third floor of the hospital at the Cath Lab Waiting Area at 10:30 AM. Take the "Gold" elevators to the 3rd floor- follow signs for the Cath Lab waiting room, check in at the desk.      The hospital is located at [address] [place] [address] (across the street from the pediatric clinic building).      You should not have any solids or milk products after midnight the morning of the procedure.   Clear liquids such as black coffee, sprite, or water are allowed until 7:30 AM.  Eating or drinking after the above listed time could result in cancellation of the procedure.      Please hold your lasix, aldactone, and entresto the morning of the procedure.     Please anticipate at least a one night stay at the hospital.     IMPORTANT: If you experience symptoms prior to your procedure such as: fever, cough, runny nose, vomiting, and/or congestion, please contact our office as soon as possible at [phone].     Please feel free to call with any questions or concerns. [phone].  "

## 2025-07-11 ENCOUNTER — HOSPITAL ENCOUNTER (OUTPATIENT)
Facility: HOSPITAL | Age: 55
Discharge: HOME | End: 2025-07-11
Attending: PEDIATRICS | Admitting: PEDIATRICS
Payer: COMMERCIAL

## 2025-07-11 ENCOUNTER — ANESTHESIA (OUTPATIENT)
Dept: MEDSURG UNIT | Facility: HOSPITAL | Age: 55
End: 2025-07-11
Payer: COMMERCIAL

## 2025-07-11 VITALS
HEIGHT: 69 IN | DIASTOLIC BLOOD PRESSURE: 72 MMHG | HEART RATE: 60 BPM | TEMPERATURE: 68 F | SYSTOLIC BLOOD PRESSURE: 135 MMHG | WEIGHT: 157.63 LBS | OXYGEN SATURATION: 96 % | BODY MASS INDEX: 23.35 KG/M2 | RESPIRATION RATE: 20 BRPM

## 2025-07-11 DIAGNOSIS — I50.42 CHF (CONGESTIVE HEART FAILURE), NYHA CLASS II, CHRONIC, COMBINED: ICD-10-CM

## 2025-07-11 DIAGNOSIS — Z87.74 TETRALOGY OF FALLOT S/P REPAIR: ICD-10-CM

## 2025-07-11 DIAGNOSIS — Q24.9 ADULT CONGENITAL HEART DISEASE: ICD-10-CM

## 2025-07-11 DIAGNOSIS — Q25.6 STENOSIS OF LEFT PULMONARY ARTERY: ICD-10-CM

## 2025-07-11 DIAGNOSIS — Q21.3 TOF (TETRALOGY OF FALLOT): Primary | ICD-10-CM

## 2025-07-11 DIAGNOSIS — Z95.2 PULMONARY VALVE REPLACED: ICD-10-CM

## 2025-07-11 DIAGNOSIS — Q25.6 CONGENITAL STENOSIS OF LEFT PULMONARY ARTERY: ICD-10-CM

## 2025-07-11 DIAGNOSIS — I45.10 RBBB: ICD-10-CM

## 2025-07-11 DIAGNOSIS — I37.2 NONRHEUMATIC PULMONARY VALVE STENOSIS WITH INSUFFICIENCY: ICD-10-CM

## 2025-07-11 LAB
ABSOLUTE EOSINOPHIL (OHS): 0.94 K/UL
ABSOLUTE MONOCYTE (OHS): 0.73 K/UL (ref 0.3–1)
ABSOLUTE NEUTROPHIL COUNT (OHS): 5.43 K/UL (ref 1.8–7.7)
ANION GAP (OHS): 8 MMOL/L (ref 8–16)
BASOPHILS # BLD AUTO: 0.06 K/UL
BASOPHILS NFR BLD AUTO: 0.6 %
BUN SERPL-MCNC: 15 MG/DL (ref 6–20)
CALCIUM SERPL-MCNC: 9.4 MG/DL (ref 8.7–10.5)
CHLORIDE SERPL-SCNC: 108 MMOL/L (ref 95–110)
CO2 SERPL-SCNC: 21 MMOL/L (ref 23–29)
CREAT SERPL-MCNC: 1 MG/DL (ref 0.5–1.4)
ERYTHROCYTE [DISTWIDTH] IN BLOOD BY AUTOMATED COUNT: 12.2 % (ref 11.5–14.5)
GFR SERPLBLD CREATININE-BSD FMLA CKD-EPI: >60 ML/MIN/1.73/M2
GLUCOSE SERPL-MCNC: 92 MG/DL (ref 70–110)
HCT VFR BLD AUTO: 37.3 % (ref 40–54)
HGB BLD-MCNC: 12.5 GM/DL (ref 14–18)
IMM GRANULOCYTES # BLD AUTO: 0.02 K/UL (ref 0–0.04)
IMM GRANULOCYTES NFR BLD AUTO: 0.2 % (ref 0–0.5)
INDIRECT COOMBS: NORMAL
LYMPHOCYTES # BLD AUTO: 3.19 K/UL (ref 1–4.8)
MCH RBC QN AUTO: 31.8 PG (ref 27–31)
MCHC RBC AUTO-ENTMCNC: 33.5 G/DL (ref 32–36)
MCV RBC AUTO: 95 FL (ref 82–98)
NUCLEATED RBC (/100WBC) (OHS): 0 /100 WBC
PLATELET # BLD AUTO: 229 K/UL (ref 150–450)
PMV BLD AUTO: 9.9 FL (ref 9.2–12.9)
POTASSIUM SERPL-SCNC: 4.3 MMOL/L (ref 3.5–5.1)
RBC # BLD AUTO: 3.93 M/UL (ref 4.6–6.2)
RELATIVE EOSINOPHIL (OHS): 9.1 %
RELATIVE LYMPHOCYTE (OHS): 30.8 % (ref 18–48)
RELATIVE MONOCYTE (OHS): 7 % (ref 4–15)
RELATIVE NEUTROPHIL (OHS): 52.3 % (ref 38–73)
RH BLD: NORMAL
SODIUM SERPL-SCNC: 137 MMOL/L (ref 136–145)
SPECIMEN OUTDATE: NORMAL
WBC # BLD AUTO: 10.37 K/UL (ref 3.9–12.7)

## 2025-07-11 PROCEDURE — C1725 CATH, TRANSLUMIN NON-LASER: HCPCS | Performed by: PEDIATRICS

## 2025-07-11 PROCEDURE — 33900 PERQ P-ART REVSC 1 NM NT UNI: CPT | Mod: LT | Performed by: PEDIATRICS

## 2025-07-11 PROCEDURE — 93568 NJX CAR CTH NSLC P-ART ANGRP: CPT | Mod: 59,,, | Performed by: PEDIATRICS

## 2025-07-11 PROCEDURE — 37000009 HC ANESTHESIA EA ADD 15 MINS: Performed by: PEDIATRICS

## 2025-07-11 PROCEDURE — C1751 CATH, INF, PER/CENT/MIDLINE: HCPCS | Performed by: PEDIATRICS

## 2025-07-11 PROCEDURE — 25000003 PHARM REV CODE 250: Performed by: PEDIATRICS

## 2025-07-11 PROCEDURE — 84132 ASSAY OF SERUM POTASSIUM: CPT | Performed by: PEDIATRICS

## 2025-07-11 PROCEDURE — 33900 PERQ P-ART REVSC 1 NM NT UNI: CPT | Mod: 82,LT,, | Performed by: PEDIATRICS

## 2025-07-11 PROCEDURE — 82947 ASSAY GLUCOSE BLOOD QUANT: CPT | Performed by: PEDIATRICS

## 2025-07-11 PROCEDURE — 93596 R&L HRT CATH CHD NML NT CNJ: CPT | Mod: 26,22,59, | Performed by: PEDIATRICS

## 2025-07-11 PROCEDURE — 83605 ASSAY OF LACTIC ACID: CPT | Performed by: PEDIATRICS

## 2025-07-11 PROCEDURE — 82330 ASSAY OF CALCIUM: CPT | Performed by: PEDIATRICS

## 2025-07-11 PROCEDURE — 37000008 HC ANESTHESIA 1ST 15 MINUTES: Performed by: PEDIATRICS

## 2025-07-11 PROCEDURE — 80048 BASIC METABOLIC PNL TOTAL CA: CPT | Performed by: PEDIATRICS

## 2025-07-11 PROCEDURE — 82805 BLOOD GASES W/O2 SATURATION: CPT | Performed by: PEDIATRICS

## 2025-07-11 PROCEDURE — 86900 BLOOD TYPING SEROLOGIC ABO: CPT | Performed by: PEDIATRICS

## 2025-07-11 PROCEDURE — 63600175 PHARM REV CODE 636 W HCPCS: Performed by: ANESTHESIOLOGY

## 2025-07-11 PROCEDURE — C1894 INTRO/SHEATH, NON-LASER: HCPCS | Performed by: PEDIATRICS

## 2025-07-11 PROCEDURE — 27201423 OPTIME MED/SURG SUP & DEVICES STERILE SUPPLY: Performed by: PEDIATRICS

## 2025-07-11 PROCEDURE — C1876 STENT, NON-COA/NON-COV W/DEL: HCPCS | Performed by: PEDIATRICS

## 2025-07-11 PROCEDURE — 25000003 PHARM REV CODE 250: Performed by: ANESTHESIOLOGY

## 2025-07-11 PROCEDURE — C1769 GUIDE WIRE: HCPCS | Performed by: PEDIATRICS

## 2025-07-11 PROCEDURE — 85025 COMPLETE CBC W/AUTO DIFF WBC: CPT | Performed by: PEDIATRICS

## 2025-07-11 PROCEDURE — 25500020 PHARM REV CODE 255: Performed by: PEDIATRICS

## 2025-07-11 PROCEDURE — 93568 NJX CAR CTH NSLC P-ART ANGRP: CPT | Performed by: PEDIATRICS

## 2025-07-11 PROCEDURE — 85347 COAGULATION TIME ACTIVATED: CPT | Performed by: PEDIATRICS

## 2025-07-11 PROCEDURE — 86920 COMPATIBILITY TEST SPIN: CPT | Performed by: PEDIATRICS

## 2025-07-11 PROCEDURE — 93596 R&L HRT CATH CHD NML NT CNJ: CPT | Performed by: PEDIATRICS

## 2025-07-11 DEVICE — LARGE DIAMETER BILIARY STENT
Type: IMPLANTABLE DEVICE | Site: HEART | Status: FUNCTIONAL
Brand: INTRASTENT LD MEGA

## 2025-07-11 RX ORDER — LIDOCAINE HYDROCHLORIDE 10 MG/ML
1 INJECTION, SOLUTION EPIDURAL; INFILTRATION; INTRACAUDAL; PERINEURAL ONCE
Status: DISCONTINUED | OUTPATIENT
Start: 2025-07-11 | End: 2025-07-11 | Stop reason: HOSPADM

## 2025-07-11 RX ORDER — MIDAZOLAM HYDROCHLORIDE 1 MG/ML
INJECTION INTRAMUSCULAR; INTRAVENOUS
Status: DISCONTINUED | OUTPATIENT
Start: 2025-07-11 | End: 2025-07-11

## 2025-07-11 RX ORDER — HYDROMORPHONE HYDROCHLORIDE 1 MG/ML
0.2 INJECTION, SOLUTION INTRAMUSCULAR; INTRAVENOUS; SUBCUTANEOUS EVERY 5 MIN PRN
Status: DISCONTINUED | OUTPATIENT
Start: 2025-07-11 | End: 2025-07-11 | Stop reason: HOSPADM

## 2025-07-11 RX ORDER — LIDOCAINE HYDROCHLORIDE 20 MG/ML
INJECTION INTRAVENOUS
Status: DISCONTINUED | OUTPATIENT
Start: 2025-07-11 | End: 2025-07-11

## 2025-07-11 RX ORDER — ONDANSETRON HYDROCHLORIDE 2 MG/ML
INJECTION, SOLUTION INTRAVENOUS
Status: DISCONTINUED | OUTPATIENT
Start: 2025-07-11 | End: 2025-07-11

## 2025-07-11 RX ORDER — IODIXANOL 320 MG/ML
INJECTION, SOLUTION INTRAVASCULAR
Status: DISCONTINUED | OUTPATIENT
Start: 2025-07-11 | End: 2025-07-11 | Stop reason: HOSPADM

## 2025-07-11 RX ORDER — ACETAMINOPHEN 325 MG/1
650 TABLET ORAL EVERY 6 HOURS PRN
Status: DISCONTINUED | OUTPATIENT
Start: 2025-07-11 | End: 2025-07-11 | Stop reason: HOSPADM

## 2025-07-11 RX ORDER — DEXAMETHASONE SODIUM PHOSPHATE 4 MG/ML
INJECTION, SOLUTION INTRA-ARTICULAR; INTRALESIONAL; INTRAMUSCULAR; INTRAVENOUS; SOFT TISSUE
Status: DISCONTINUED | OUTPATIENT
Start: 2025-07-11 | End: 2025-07-11

## 2025-07-11 RX ORDER — PHENYLEPHRINE HYDROCHLORIDE 10 MG/ML
INJECTION INTRAVENOUS
Status: DISCONTINUED | OUTPATIENT
Start: 2025-07-11 | End: 2025-07-11

## 2025-07-11 RX ORDER — MORPHINE SULFATE 2 MG/ML
2 INJECTION, SOLUTION INTRAMUSCULAR; INTRAVENOUS
Refills: 0 | Status: DISCONTINUED | OUTPATIENT
Start: 2025-07-11 | End: 2025-07-11 | Stop reason: HOSPADM

## 2025-07-11 RX ORDER — SODIUM CHLORIDE 0.9 % (FLUSH) 0.9 %
3 SYRINGE (ML) INJECTION
Status: DISCONTINUED | OUTPATIENT
Start: 2025-07-11 | End: 2025-07-11 | Stop reason: HOSPADM

## 2025-07-11 RX ORDER — ONDANSETRON HYDROCHLORIDE 2 MG/ML
4 INJECTION, SOLUTION INTRAVENOUS EVERY 12 HOURS PRN
Status: DISCONTINUED | OUTPATIENT
Start: 2025-07-11 | End: 2025-07-11 | Stop reason: HOSPADM

## 2025-07-11 RX ORDER — DEXMEDETOMIDINE HYDROCHLORIDE 100 UG/ML
INJECTION, SOLUTION INTRAVENOUS
Status: DISCONTINUED | OUTPATIENT
Start: 2025-07-11 | End: 2025-07-11

## 2025-07-11 RX ORDER — SODIUM CHLORIDE 9 MG/ML
INJECTION, SOLUTION INTRAVENOUS CONTINUOUS
Status: DISCONTINUED | OUTPATIENT
Start: 2025-07-11 | End: 2025-07-11 | Stop reason: HOSPADM

## 2025-07-11 RX ORDER — ACETAMINOPHEN 325 MG/1
650 TABLET ORAL EVERY 4 HOURS PRN
Status: DISCONTINUED | OUTPATIENT
Start: 2025-07-11 | End: 2025-07-11 | Stop reason: HOSPADM

## 2025-07-11 RX ORDER — CEFAZOLIN SODIUM 1 G/3ML
INJECTION, POWDER, FOR SOLUTION INTRAMUSCULAR; INTRAVENOUS
Status: DISCONTINUED | OUTPATIENT
Start: 2025-07-11 | End: 2025-07-11

## 2025-07-11 RX ORDER — ROCURONIUM BROMIDE 10 MG/ML
INJECTION, SOLUTION INTRAVENOUS
Status: DISCONTINUED | OUTPATIENT
Start: 2025-07-11 | End: 2025-07-11

## 2025-07-11 RX ORDER — FENTANYL CITRATE 50 UG/ML
INJECTION, SOLUTION INTRAMUSCULAR; INTRAVENOUS
Status: DISCONTINUED | OUTPATIENT
Start: 2025-07-11 | End: 2025-07-11

## 2025-07-11 RX ORDER — PROCHLORPERAZINE EDISYLATE 5 MG/ML
5 INJECTION INTRAMUSCULAR; INTRAVENOUS EVERY 30 MIN PRN
Status: DISCONTINUED | OUTPATIENT
Start: 2025-07-11 | End: 2025-07-11 | Stop reason: HOSPADM

## 2025-07-11 RX ORDER — HALOPERIDOL LACTATE 5 MG/ML
0.5 INJECTION, SOLUTION INTRAMUSCULAR EVERY 10 MIN PRN
Status: DISCONTINUED | OUTPATIENT
Start: 2025-07-11 | End: 2025-07-11 | Stop reason: HOSPADM

## 2025-07-11 RX ORDER — PROTAMINE SULFATE 10 MG/ML
INJECTION, SOLUTION INTRAVENOUS
Status: DISCONTINUED | OUTPATIENT
Start: 2025-07-11 | End: 2025-07-11

## 2025-07-11 RX ORDER — PROPOFOL 10 MG/ML
VIAL (ML) INTRAVENOUS
Status: DISCONTINUED | OUTPATIENT
Start: 2025-07-11 | End: 2025-07-11

## 2025-07-11 RX ORDER — GLUCAGON 1 MG
1 KIT INJECTION
Status: DISCONTINUED | OUTPATIENT
Start: 2025-07-11 | End: 2025-07-11 | Stop reason: HOSPADM

## 2025-07-11 RX ORDER — HEPARIN SODIUM 1000 [USP'U]/ML
INJECTION, SOLUTION INTRAVENOUS; SUBCUTANEOUS
Status: DISCONTINUED | OUTPATIENT
Start: 2025-07-11 | End: 2025-07-11

## 2025-07-11 RX ORDER — PHENYLEPHRINE HCL IN 0.9% NACL 1 MG/10 ML
SYRINGE (ML) INTRAVENOUS
Status: DISCONTINUED | OUTPATIENT
Start: 2025-07-11 | End: 2025-07-11

## 2025-07-11 RX ADMIN — PROTAMINE SULFATE 40 MG: 10 INJECTION, SOLUTION INTRAVENOUS at 02:07

## 2025-07-11 RX ADMIN — ROCURONIUM BROMIDE 10 MG: 10 INJECTION INTRAVENOUS at 02:07

## 2025-07-11 RX ADMIN — SODIUM CHLORIDE: 0.9 INJECTION, SOLUTION INTRAVENOUS at 01:07

## 2025-07-11 RX ADMIN — SUGAMMADEX 200 MG: 100 INJECTION, SOLUTION INTRAVENOUS at 02:07

## 2025-07-11 RX ADMIN — Medication 100 MCG: at 01:07

## 2025-07-11 RX ADMIN — HEPARIN SODIUM 5000 UNITS: 1000 INJECTION, SOLUTION INTRAVENOUS; SUBCUTANEOUS at 02:07

## 2025-07-11 RX ADMIN — ONDANSETRON 4 MG: 2 INJECTION INTRAMUSCULAR; INTRAVENOUS at 02:07

## 2025-07-11 RX ADMIN — HEPARIN SODIUM 4000 UNITS: 1000 INJECTION, SOLUTION INTRAVENOUS; SUBCUTANEOUS at 02:07

## 2025-07-11 RX ADMIN — ROCURONIUM BROMIDE 50 MG: 10 INJECTION INTRAVENOUS at 01:07

## 2025-07-11 RX ADMIN — FENTANYL CITRATE 100 MCG: 50 INJECTION, SOLUTION INTRAMUSCULAR; INTRAVENOUS at 01:07

## 2025-07-11 RX ADMIN — MIDAZOLAM HYDROCHLORIDE 2 MG: 2 INJECTION, SOLUTION INTRAMUSCULAR; INTRAVENOUS at 01:07

## 2025-07-11 RX ADMIN — PHENYLEPHRINE HYDROCHLORIDE 50 MCG: 10 INJECTION INTRAVENOUS at 01:07

## 2025-07-11 RX ADMIN — LIDOCAINE HYDROCHLORIDE 100 MG: 20 INJECTION INTRAVENOUS at 01:07

## 2025-07-11 RX ADMIN — ACETAMINOPHEN 650 MG: 325 TABLET ORAL at 05:07

## 2025-07-11 RX ADMIN — DEXAMETHASONE SODIUM PHOSPHATE 4 MG: 4 INJECTION INTRA-ARTICULAR; INTRALESIONAL; INTRAMUSCULAR; INTRAVENOUS; SOFT TISSUE at 01:07

## 2025-07-11 RX ADMIN — DEXMEDETOMIDINE 8 MCG: 200 INJECTION, SOLUTION INTRAVENOUS at 02:07

## 2025-07-11 RX ADMIN — PROPOFOL 150 MG: 10 INJECTION, EMULSION INTRAVENOUS at 01:07

## 2025-07-11 RX ADMIN — CEFAZOLIN 2 G: 330 INJECTION, POWDER, FOR SOLUTION INTRAMUSCULAR; INTRAVENOUS at 01:07

## 2025-07-11 NOTE — Clinical Note
The sheath was inserted into the right femoral vein.  And repositioned to the [facility].

## 2025-07-11 NOTE — DISCHARGE INSTRUCTIONS
.dischargeAFTER THE PROCEDURE:  You may remove the bandage in 24 hours and wash with soap and water.  You may shower, but do not soak in a tub for three days.     PRECAUTIONS FOR THE NEXT 24 HOURS:  If you need to cough, sneeze, have a bowel movement, or bear down, hold pressure over your bandage.  Do not  anything heavier than a gallon of milk(about 5 pounds)  Avoid excessive bending over.    SYMPTOMS TO WATCH FOR AND REPORT TO YOUR DOCTOR:  BLEEDING: hold pressure over the site until bleeding stops. Proceed to Emergency Room by ambulance (do not drive yourself) if unable to stop bleeding. Notify your doctor.  HEMATOMA (hard bruise under the skin): Surya around the bruise if one develops. Call your doctor if it increases in size or if you have difficulty talking, swallowing, breathing or anything unusual.  SIGNS OF INFECTION: Fever (temperature over 100.5 F), pus or redness  RASH  CHEST PAIN OR SHORTNESS OF BREATH    You may call the Pediatric Cardiology Service doctor on call at [phone].     Discharge Instructions and Care of Your Groin    Catheter Insertion Site  The morning after your procedure, you may take the dressing off. The easiest way to do this is when you are showering, get the tape and dressing wet and remove it.  After the bandage is removed, cover the area with a small adhesive bandage. It is normal for the catheter insertion site to be black and blue for a couple of days. The site may also be slightly swollen and pink, and there may be a small lump (about the size of a quarter) at the site.  Wash the catheter insertion site at least once daily with soap and water. Place soapy water on your hand or washcloth and gently wash the insertion site; do not rub.  Keep the area clean and dry when you are not showering.  Do not use creams, lotions or ointment on the wound site.  Wear loose clothes and loose underwear.  Do not take a bath, tub soak, go in a Jacuzzi, or swim in a pool or lake for  one week after the procedure.    Activity Instructions  Do not strain during bowel movements for the first 3 to 4 days after the procedure to prevent bleeding from the catheter insertion site.  Avoid heavy lifting (more than 10 pounds) and pushing or pulling heavy objects for the first 5 to 7 days after the procedure.  Do not participate in strenuous activities for 5 days after the procedure. This includes most sports - jogging, golfing, play tennis, and bowling.  You may climb stairs if needed, but walk up and down the stairs more slowly than usual.  Gradually increase your activities until you reach your normal activity level within one week after the procedure.    If bleeding should occur following discharge:  Sit down and apply firm pressure to the puncture site with your fingers for 10 minutes   If the bleeding stops, continue to sit quietly, keeping your leg straight for 2 hours. Notify your physician as soon as possible   If bleeding does not stop after 10 minutes or if there is a large amount of bleeding or spurting, call 911 immediately.  Do not drive yourself to the hospital.     Notify the Pediatric Cardiology Service doctor on call at [phone] if these symptoms persist or if you experience:  Change in color or temperature of the leg  Redness, heat, or pus at the puncture site  Chills or fever greater than 100.5 F

## 2025-07-11 NOTE — SUBJECTIVE & OBJECTIVE
Past Medical History:   Diagnosis Date    Fracture, tibia and fibula     left History of    Palpitations     Recurrent ventricular tachycardia        Past Surgical History:   Procedure Laterality Date    ABLATION N/A 4/24/2023    Procedure: VT Ablation;  Surgeon: Eric Albert MD;  Location: [facility] CATH;  Service: Cardiology;  Laterality: N/A;  Pt has an AICD, ops # 13    ANGIOGRAM, CORONARY, PEDIATRIC  9/28/2023    Procedure: Angiogram, Coronary, Pediatric;  Surgeon: Sheree Chen III., MD;  Location: [facility] CATH LAB;  Service: Cardiology;;    ANGIOGRAM, PULMONARY, PEDIATRIC  9/28/2023    Procedure: Angiogram, Pulmonary, Pediatric;  Surgeon: Sheree Chen III., MD;  Location: [facility] CATH LAB;  Service: Cardiology;;    COMBINED RIGHT AND RETROGRADE LEFT HEART CATHETERIZATION FOR CONGENITAL HEART DEFECT N/A 9/28/2023    Procedure: Catheterization, Heart, Combined Right and Retrograde Left, for Congenital Heart Defect;  Surgeon: Sheree Chen III., MD;  Location: [facility] CATH LAB;  Service: Cardiology;  Laterality: N/A;    INSERTION, ICD, DUAL CHAMBER  08/17/2022    REPLACE, PULMONARY, VALVE, PEDIATRIC N/A 9/28/2023    Procedure: Replace, Pulmonary, Valve, Pediatric;  Surgeon: Sheree Chen III., MD;  Location: [facility] CATH LAB;  Service: Cardiology;  Laterality: N/A;    TETRALOGY OF FALLOT REPAIR      VALVULOPLASTY, PULMONARY, PEDIATRIC  9/28/2023    Procedure: Valvuloplasty, Pulmonary, Pediatric;  Surgeon: Sheree Chen III., MD;  Location: [facility] CATH LAB;  Service: Cardiology;;       Review of patient's allergies indicates:  No Known Allergies    No current facility-administered medications on file prior to encounter.     Current Outpatient Medications on File Prior to Encounter   Medication Sig    aspirin 81 MG Chew Take 1 tablet (81 mg total) by mouth once daily.    atorvastatin (LIPITOR) 20 MG tablet Take 20 mg by mouth once daily.    carvediloL (COREG) 12.5 MG tablet Take 1 tablet (12.5 mg total) by  mouth 2 (two) times daily with meals.    ENTRESTO 24-26 mg per tablet Take 1 tablet by mouth 2 (two) times daily.    furosemide (LASIX) 20 MG tablet Daily as needed for weight gain 2-3 lb in 24 hours, shortness of breath, swelling (Patient taking differently: Take 20 mg by mouth daily as needed. Daily as needed for weight gain 2-3 lb in 24 hours, shortness of breath, swelling)    spironolactone (ALDACTONE) 25 MG tablet Take 1 tablet (25 mg total) by mouth 2 (two) times daily.    amiodarone (PACERONE) 100 MG Tab Take 100 mg by mouth twice a week.    [DISCONTINUED] metoprolol tartrate (LOPRESSOR) 25 MG tablet Take 25 mg by mouth 2 (two) times daily.    [DISCONTINUED] valsartan (DIOVAN) 160 MG tablet Take 160 mg by mouth once daily.     Family History       Problem Relation (Age of Onset)    Diabetes Mother    Heart attack Father    No Known Problems Sister, Sister, Sister          Tobacco Use    Smoking status: Never     Passive exposure: Never    Smokeless tobacco: Current     Types: Snuff, Chew   Substance and Sexual Activity    Alcohol use: Not on file     Comment: occasionally    Drug use: Not on file    Sexual activity: Not on file     Review of Systems   Constitutional: Negative for fever and night sweats.   HENT:  Negative for congestion and sore throat.    Eyes:  Negative for blurred vision and double vision.   Cardiovascular:  Positive for dyspnea on exertion. Negative for chest pain, irregular heartbeat, leg swelling, near-syncope, palpitations and syncope.   Respiratory:  Positive for shortness of breath. Negative for cough.    Skin:  Negative for dry skin and rash.   Musculoskeletal:  Negative for myalgias and neck pain.   Gastrointestinal:  Negative for constipation, diarrhea, nausea and vomiting.   Genitourinary:  Negative for dysuria and hematuria.   Neurological:  Positive for light-headedness. Negative for dizziness and headaches.        Orthostatic hypotension   Psychiatric/Behavioral:  Negative for  depression. The patient is not nervous/anxious.      Objective:     Vital Signs (Most Recent):  Temp: 98.2 °F (36.8 °C) (07/11/25 1045)  Pulse: 68 (07/11/25 1045)  Resp: 18 (07/11/25 1045)  BP: 139/74 (07/11/25 1119)  SpO2: 95 % (07/11/25 1045) Vital Signs (24h Range):  Temp:  [98.2 °F (36.8 °C)] 98.2 °F (36.8 °C)  Pulse:  [68] 68  Resp:  [18] 18  SpO2:  [95 %] 95 %  BP: (139)/(74) 139/74     Weight: 71.5 kg (157 lb 10.1 oz)  Body mass index is 23.28 kg/m².    SpO2: 95 %       No intake or output data in the 24 hours ending 07/11/25 1123    Lines/Drains/Airways       None                    Physical Exam  Constitutional:       Appearance: Normal appearance. He is normal weight. He is not ill-appearing.   HENT:      Head: Normocephalic and atraumatic.      Nose: Nose normal. No congestion or rhinorrhea.      Mouth/Throat:      Mouth: Mucous membranes are moist.      Pharynx: Oropharynx is clear.   Eyes:      Extraocular Movements: Extraocular movements intact.      Pupils: Pupils are equal, round, and reactive to light.   Cardiovascular:      Rate and Rhythm: Normal rate and regular rhythm.      Pulses: Normal pulses.      Heart sounds: Murmur heard.   Pulmonary:      Effort: Pulmonary effort is normal. No respiratory distress.      Breath sounds: Normal breath sounds.   Abdominal:      General: Bowel sounds are normal.      Tenderness: There is no abdominal tenderness. There is no guarding.   Musculoskeletal:         General: No swelling. Normal range of motion.      Cervical back: Normal range of motion. No rigidity.      Right lower leg: No edema.      Left lower leg: No edema.   Lymphadenopathy:      Cervical: No cervical adenopathy.   Skin:     General: Skin is warm.      Capillary Refill: Capillary refill takes less than 2 seconds.      Findings: No rash.   Neurological:      General: No focal deficit present.      Mental Status: He is alert and oriented to person, place, and time.   Psychiatric:         Mood  and Affect: Mood normal.         Behavior: Behavior normal.          Significant Labs:     CMP  Creatinine   Date Value Ref Range Status   07/24/2024 1.3 0.5 - 1.4 mg/dL Final     eGFR   Date Value Ref Range Status   07/24/2024 >60.0 >60 mL/min/1.73 m^2 Final      Lab Results   Component Value Date    WBC 7.54 09/28/2023    HGB 10.9 (L) 09/28/2023    HCT 30 (L) 09/28/2023    MCV 94 09/28/2023     09/28/2023     Significant Imaging:     MRI 5/15/25:  Right atrial enlargement.   Difficult to assess TR in setting of right heart ICD lead  Normal right ventricular volumes. (RVEDV 92 cc/m2 for BSA, RVESV 39 cc/m2 for BSA). RVEF 43 %. There is artifact from the ICD affecting the superior/anterior RV limiting accuracy.  Normal left ventricular volume with LVEF 55 %.  Dilated and thin proximal RVOT consistent with patch. Well-seated pulmonary valve prosthesis with no significant insufficiency and top normal velocity <2m/sec.    Dilated MPA. Dilated RPA with severe discrete proximal LPA stenosis.   Mildly enlarged aortic root and ascending aorta.   Delayed enhancement: There is evidence of myocardial fibrosis in the RVOT (likely patch) and at the RV insertion point (frequent in tetralogy of Fallot.    When compared to prior MRI, pulmonary valve prosthesis in place and functioning well. RV has significantly decreased in size. LV function has improved.      Cath 9/28/23:  1) Repaired tetralogy of Fallot with free pulmonary insufficiency and RV enlargement.  2) Severe proximal LPA stenosis  3) Normal PA pressure, cardiac output, and vascular resistance calculations.  4) Successful TPV 25 [place] valve implantation (gradient 5mmHg, no insufficiency)  5) Normal coronary angiography

## 2025-07-11 NOTE — ASSESSMENT & PLAN NOTE
Cristobal Kidd III is a 54 y.o. [facility]. To summarize his diagnoses are as follow:  1. History of tetralogy of Fallot status post 2 surgeries at [facility], 1st at about 3 years of age, 2nd at about 7 years of age. Both performed via median sternotomy.   - severe pulmonary valve insufficiency with severe right ventricular enlargement and preserved function now status post 25 mm [place] valve implantation September 28, 2023 with excellent result  - no stenosis or insufficiency of the new pulmonary valve  - dramatic improvement in RV size (now normal with normal function)  - likely moderate Tricuspid insufficiency with ICD crossing the TV  - significant proximal LPA narrowing   2. History of ventricular tachycardia status post Medtronic ICD implantation  - device and leads are MRI compatible. Generator model number is [id number], pacer lead [id number], ICD lead [id number].   - now status post ablation procedure April 24, 2023 by Dr. Albert  - followed by Dr. Encarnacion  3. Borderline pulmonary hypertension in the right pulmonary artery exacerbated by severe left pulmonary artery stenosis  4. Reported history of nonischemic cardiomyopathy  - currently with normal left ventricular function. LVEF on MRI improved from 44 to 55% on MRI after pulmonary valve replacement.  5. Hypertension  6. Family history of coronary artery disease, father with history  - normal coronary arteries on catheterization September 2023  7. Mild aortic insufficiency with mild aortic root enlargement, typical for tetralogy    Plan:  Scheduled today for the cath lab for LPA dilation/stent.  He did very well in this procedure and was deemed ready for discharge the same day.  Follow up with Nino Encarnacion (approx 6 months) and Dr. Galvan (approx 1 year)

## 2025-07-11 NOTE — ASSESSMENT & PLAN NOTE
Cristobal Kidd III is a 54 y.o. [facility]. To summarize his diagnoses are as follow:  1. History of tetralogy of Fallot status post 2 surgeries at [facility], 1st at about 3 years of age, 2nd at about 7 years of age. Both performed via median sternotomy.   - severe pulmonary valve insufficiency with severe right ventricular enlargement and preserved function now status post 25 mm [place] valve implantation September 28, 2023 with excellent result  - no stenosis or insufficiency of the new pulmonary valve  - dramatic improvement in RV size (now normal with normal function)  - likely moderate Tricuspid insufficiency with ICD crossing the TV  - significant proximal LPA narrowing   2. History of ventricular tachycardia status post Medtronic ICD implantation  - device and leads are MRI compatible. Generator model number is [id number], pacer lead [id number], ICD lead [id number].   - now status post ablation procedure April 24, 2023 by Dr. Albert  - followed by Dr. Encarnacion  3. Borderline pulmonary hypertension in the right pulmonary artery exacerbated by severe left pulmonary artery stenosis  4. Reported history of nonischemic cardiomyopathy  - currently with normal left ventricular function. LVEF on MRI improved from 44 to 55% on MRI after pulmonary valve replacement.  5. Hypertension  6. Family history of coronary artery disease, father with history  - normal coronary arteries on catheterization September 2023  7. Mild aortic insufficiency with mild aortic root enlargement, typical for tetralogy    Plan:  Scheduled today for a the cath lab. If amenable to catheter based intervention then would likely benefit from LPA dilation/stent.  If LPA intervened on then he will likely transfer this evening to the adult cardiac floor and be monitored overnight on Heart failure's service.   [facility] team will put in transfer orders to the floor and contact heart failure. Will order a  discharge echo for tomorrow morning.

## 2025-07-11 NOTE — TRANSFER OF CARE
"Anesthesia Transfer of Care Note    Patient: Cristobal Kidd III    Procedure(s) Performed: Procedure(s) (LRB):  Catheterization, Heart, Combined Right and Retrograde Left, for Congenital Heart Defect (N/A)  Angiogram, Pulmonary, Pediatric  Stent, Pulmonary Artery, Pediatric  PTA, Pulmonary Branch, Pediatric    Patient location: PACU    Anesthesia Type: general    Transport from OR: Transported from OR on 6-10 L/min O2 by face mask with adequate spontaneous ventilation    Post pain: adequate analgesia    Post assessment: no apparent anesthetic complications    Post vital signs: stable    Level of consciousness: awake and alert    Nausea/Vomiting: no nausea/vomiting    Complications: none    Transfer of care protocol was followed      Last vitals: Visit Vitals  /74   Pulse 68   Temp 36.8 °C (98.2 °F) (Temporal)   Resp 18   Ht 5' 9" (1.753 m)   Wt 71.5 kg (157 lb 10.1 oz)   SpO2 95%   BMI 23.28 kg/m²     "

## 2025-07-11 NOTE — Clinical Note
The PA catheter was inserted into the right atrium. Hemodynamics were performed. O2 saturation was measured at 75%.

## 2025-07-11 NOTE — ANESTHESIA PROCEDURE NOTES
Intubation    Date/Time: 7/11/2025 1:30 PM    Performed by: Goldy Pierce MD  Authorized by: Goldy Pierce MD    Intubation:     Induction:  Intravenous    Intubated:  Postinduction    Mask Ventilation:  Easy mask    Attempts:  1    Attempted By:  CRNA    Method of Intubation:  Video laryngoscopy    Blade:  Hernandez 3    Laryngeal View Grade: Grade I - full view of cords      Difficult Airway Encountered?: No      Complications:  None    Airway Device:  Oral endotracheal tube    Airway Device Size:  7.5    Style/Cuff Inflation:  Cuffed (inflated to minimal occlusive pressure)    Tube secured:  22    Secured at:  The lips    Placement Verified By:  Capnometry    Complicating Factors:  None    Findings Post-Intubation:  BS equal bilateral and atraumatic/condition of teeth unchanged

## 2025-07-11 NOTE — DISCHARGE SUMMARY
[facility] Cath Lab  Discharge Note  Short Stay    Procedure(s) (LRB):  Catheterization, Heart, Combined Right and Retrograde Left, for Congenital Heart Defect (N/A)  Angiogram, Pulmonary, Pediatric  Stent, Pulmonary Artery, Pediatric  PTA, Pulmonary Branch, Pediatric      OUTCOME: Patient tolerated treatment/procedure well without complication and is now ready for discharge.    DISPOSITION: Home or Self Care    FINAL DIAGNOSIS:  Congenital stenosis of left pulmonary artery    FOLLOWUP: In clinic    DISCHARGE INSTRUCTIONS:    Discharge Procedure Orders   Diet Adult Regular     Notify your health care provider if you experience any of the following:  redness, tenderness, or signs of infection (pain, swelling, redness, odor or green/yellow discharge around incision site)     Notify your health care provider if you experience any of the following:  severe uncontrolled pain     Notify your health care provider if you experience any of the following:  persistent nausea and vomiting or diarrhea     Notify your health care provider if you experience any of the following:  temperature >100.4     Activity as tolerated        TIME SPENT ON DISCHARGE: 30 minutes

## 2025-07-11 NOTE — H&P
[facility] Stay Cardiac Unit  Adult Congenital Heart Disease   History and Physical     Patient Name: Cristobal Kidd III  MRN: [id number]  Admission Date: 7/11/2025  Code Status: Prior   Attending Provider: Sheere Chen III., *   Primary Care Physician: Johnnie Ackerman MD  Principal Problem:Congenital stenosis of left pulmonary artery    Patient information was obtained from patient and past medical records.     Subjective:     Chief Complaint:  Dyspnea on Exertion    HPI:  Cristobal Kidd III is a 54 y.o. male followed in the [facility] clinic by Dr. Galvan for tetralogy of Fallot. He is now status post 25 mm Sylvester valve implantation September 28, 2023 with excellent results. He also has a history of ventricular tachycardia status post Medtronic ICD implantation. He also has borderline pulmonary hypertension in the right pulmonary artery exacerbated by severe left pulmonary artery stenosis. He continues to have significant proximal LPA narrowing and has been scheduled today for the cath lab in hopes that he is a candidate for a cath-based intervention on his LPA. On his 2023 cardiac MRI, 88% of the blood flow went to the right lung. He continues with significant tricuspid insufficiency. He has done well since his 2023 Sylvester valve implantation with improved exercise tolerance compared to before his pulmonary valve. He does still have some dyspnea on exertion, but denies other cardiac symptoms. No edema. No syncope. No palpitations. No chest pain.    Past Medical History:   Diagnosis Date    Fracture, tibia and fibula     left History of    Palpitations     Recurrent ventricular tachycardia        Past Surgical History:   Procedure Laterality Date    ABLATION N/A 4/24/2023    Procedure: VT Ablation;  Surgeon: Eric Albert MD;  Location: [facility] CATH;  Service: Cardiology;  Laterality: N/A;  Pt has an AICD, ops # 13    ANGIOGRAM, CORONARY, PEDIATRIC  9/28/2023    Procedure: Angiogram, Coronary, Pediatric;   Surgeon: Sheree Chen III., MD;  Location: [facility] CATH LAB;  Service: Cardiology;;    ANGIOGRAM, PULMONARY, PEDIATRIC  9/28/2023    Procedure: Angiogram, Pulmonary, Pediatric;  Surgeon: Sheree Chen III., MD;  Location: [facility] CATH LAB;  Service: Cardiology;;    COMBINED RIGHT AND RETROGRADE LEFT HEART CATHETERIZATION FOR CONGENITAL HEART DEFECT N/A 9/28/2023    Procedure: Catheterization, Heart, Combined Right and Retrograde Left, for Congenital Heart Defect;  Surgeon: Sheree Chen III., MD;  Location: [facility] CATH LAB;  Service: Cardiology;  Laterality: N/A;    INSERTION, ICD, DUAL CHAMBER  08/17/2022    REPLACE, PULMONARY, VALVE, PEDIATRIC N/A 9/28/2023    Procedure: Replace, Pulmonary, Valve, Pediatric;  Surgeon: Sheree Chen III., MD;  Location: [facility] CATH LAB;  Service: Cardiology;  Laterality: N/A;    TETRALOGY OF FALLOT REPAIR      VALVULOPLASTY, PULMONARY, PEDIATRIC  9/28/2023    Procedure: Valvuloplasty, Pulmonary, Pediatric;  Surgeon: Sheree Chen III., MD;  Location: [facility] CATH LAB;  Service: Cardiology;;       Review of patient's allergies indicates:  No Known Allergies    No current facility-administered medications on file prior to encounter.     Current Outpatient Medications on File Prior to Encounter   Medication Sig    aspirin 81 MG Chew Take 1 tablet (81 mg total) by mouth once daily.    atorvastatin (LIPITOR) 20 MG tablet Take 20 mg by mouth once daily.    carvediloL (COREG) 12.5 MG tablet Take 1 tablet (12.5 mg total) by mouth 2 (two) times daily with meals.    ENTRESTO 24-26 mg per tablet Take 1 tablet by mouth 2 (two) times daily.    furosemide (LASIX) 20 MG tablet Daily as needed for weight gain 2-3 lb in 24 hours, shortness of breath, swelling (Patient taking differently: Take 20 mg by mouth daily as needed. Daily as needed for weight gain 2-3 lb in 24 hours, shortness of breath, swelling)    spironolactone (ALDACTONE) 25 MG tablet Take 1 tablet (25 mg total) by mouth 2  (two) times daily.    amiodarone (PACERONE) 100 MG Tab Take 100 mg by mouth twice a week.    [DISCONTINUED] metoprolol tartrate (LOPRESSOR) 25 MG tablet Take 25 mg by mouth 2 (two) times daily.    [DISCONTINUED] valsartan (DIOVAN) 160 MG tablet Take 160 mg by mouth once daily.     Family History       Problem Relation (Age of Onset)    Diabetes Mother    Heart attack Father    No Known Problems Sister, Sister, Sister          Tobacco Use    Smoking status: Never     Passive exposure: Never    Smokeless tobacco: Current     Types: Snuff, Chew   Substance and Sexual Activity    Alcohol use: Not on file     Comment: occasionally    Drug use: Not on file    Sexual activity: Not on file     Review of Systems   Constitutional: Negative for fever and night sweats.   HENT:  Negative for congestion and sore throat.    Eyes:  Negative for blurred vision and double vision.   Cardiovascular:  Positive for dyspnea on exertion. Negative for chest pain, irregular heartbeat, leg swelling, near-syncope, palpitations and syncope.   Respiratory:  Positive for shortness of breath. Negative for cough.    Skin:  Negative for dry skin and rash.   Musculoskeletal:  Negative for myalgias and neck pain.   Gastrointestinal:  Negative for constipation, diarrhea, nausea and vomiting.   Genitourinary:  Negative for dysuria and hematuria.   Neurological:  Positive for light-headedness. Negative for dizziness and headaches.        Orthostatic hypotension   Psychiatric/Behavioral:  Negative for depression. The patient is not nervous/anxious.      Objective:     Vital Signs (Most Recent):  Temp: 98.2 °F (36.8 °C) (07/11/25 1045)  Pulse: 68 (07/11/25 1045)  Resp: 18 (07/11/25 1045)  BP: 139/74 (07/11/25 1119)  SpO2: 95 % (07/11/25 1045) Vital Signs (24h Range):  Temp:  [98.2 °F (36.8 °C)] 98.2 °F (36.8 °C)  Pulse:  [68] 68  Resp:  [18] 18  SpO2:  [95 %] 95 %  BP: (139)/(74) 139/74     Weight: 71.5 kg (157 lb 10.1 oz)  Body mass index is 23.28  kg/m².    SpO2: 95 %       No intake or output data in the 24 hours ending 07/11/25 1123    Lines/Drains/Airways       None                    Physical Exam  Constitutional:       Appearance: Normal appearance. He is normal weight. He is not ill-appearing.   HENT:      Head: Normocephalic and atraumatic.      Nose: Nose normal. No congestion or rhinorrhea.      Mouth/Throat:      Mouth: Mucous membranes are moist.      Pharynx: Oropharynx is clear.   Eyes:      Extraocular Movements: Extraocular movements intact.      Pupils: Pupils are equal, round, and reactive to light.   Cardiovascular:      Rate and Rhythm: Normal rate and regular rhythm.      Pulses: Normal pulses.      Heart sounds: Murmur heard.   Pulmonary:      Effort: Pulmonary effort is normal. No respiratory distress.      Breath sounds: Normal breath sounds.   Abdominal:      General: Bowel sounds are normal.      Tenderness: There is no abdominal tenderness. There is no guarding.   Musculoskeletal:         General: No swelling. Normal range of motion.      Cervical back: Normal range of motion. No rigidity.      Right lower leg: No edema.      Left lower leg: No edema.   Lymphadenopathy:      Cervical: No cervical adenopathy.   Skin:     General: Skin is warm.      Capillary Refill: Capillary refill takes less than 2 seconds.      Findings: No rash.   Neurological:      General: No focal deficit present.      Mental Status: He is alert and oriented to person, place, and time.   Psychiatric:         Mood and Affect: Mood normal.         Behavior: Behavior normal.          Significant Labs:     CMP  Creatinine   Date Value Ref Range Status   07/24/2024 1.3 0.5 - 1.4 mg/dL Final     eGFR   Date Value Ref Range Status   07/24/2024 >60.0 >60 mL/min/1.73 m^2 Final      Lab Results   Component Value Date    WBC 7.54 09/28/2023    HGB 10.9 (L) 09/28/2023    HCT 30 (L) 09/28/2023    MCV 94 09/28/2023     09/28/2023     Significant Imaging:     MRI  5/15/25:  Right atrial enlargement.   Difficult to assess TR in setting of right heart ICD lead  Normal right ventricular volumes. (RVEDV 92 cc/m2 for BSA, RVESV 39 cc/m2 for BSA). RVEF 43 %. There is artifact from the ICD affecting the superior/anterior RV limiting accuracy.  Normal left ventricular volume with LVEF 55 %.  Dilated and thin proximal RVOT consistent with patch. Well-seated pulmonary valve prosthesis with no significant insufficiency and top normal velocity <2m/sec.    Dilated MPA. Dilated RPA with severe discrete proximal LPA stenosis.   Mildly enlarged aortic root and ascending aorta.   Delayed enhancement: There is evidence of myocardial fibrosis in the RVOT (likely patch) and at the RV insertion point (frequent in tetralogy of Fallot.    When compared to prior MRI, pulmonary valve prosthesis in place and functioning well. RV has significantly decreased in size. LV function has improved.      Cath 9/28/23:  1) Repaired tetralogy of Fallot with free pulmonary insufficiency and RV enlargement.  2) Severe proximal LPA stenosis  3) Normal PA pressure, cardiac output, and vascular resistance calculations.  4) Successful TPV 25 [place] valve implantation (gradient 5mmHg, no insufficiency)  5) Normal coronary angiography    Assessment and Plan:     * Congenital stenosis of left pulmonary artery  Cristobal Kidd III is a 54 y.o. followed by Dr. Galvan in the [facility] clinic for tetralogy of Fallot. To summarize his diagnoses are as follow:  1. History of tetralogy of Fallot status post 2 surgeries at [facility], 1st at about 3 years of age, 2nd at about 7 years of age. Both performed via median sternotomy.   - severe pulmonary valve insufficiency with severe right ventricular enlargement and preserved function now status post 25 mm Glenham valve implantation September 28, 2023 with excellent result  - no stenosis or insufficiency of the new pulmonary valve  - dramatic improvement in RV size (now normal  with normal function)  - likely moderate Tricuspid insufficiency with ICD crossing the TV  - significant proximal LPA narrowing   2. History of ventricular tachycardia status post Medtronic ICD implantation  - device and leads are MRI compatible. Generator model number is [id number], pacer lead [id number], ICD lead [id number].   - now status post ablation procedure April 24, 2023 by Dr. Albert  - followed by Dr. Encarnacion  3. Borderline pulmonary hypertension in the right pulmonary artery exacerbated by severe left pulmonary artery stenosis  4. Reported history of nonischemic cardiomyopathy  - currently with normal left ventricular function. LVEF on MRI improved from 44 to 55% on MRI after pulmonary valve replacement.  5. Hypertension  6. Family history of coronary artery disease, father with history  - normal coronary arteries on catheterization September 2023  7. Mild aortic insufficiency with mild aortic root enlargement, typical for tetralogy    Plan:  Scheduled today for a the cath lab. If amenable to catheter based intervention then would likely benefit from LPA dilation/stent.    VTE Risk Mitigation (From admission, onward)      None          Susan Delcid PA-C  Adult Congenital Heart Disease

## 2025-07-11 NOTE — PROCEDURE NOTE ADDENDUM
Certification of Assistant at Surgery       Surgery Date: 7/11/2025     Participating Surgeons:  Surgeons and Role:     * Shane Meyer Jr., MD - Primary     * Sheree Chen III., MD - Assisting    Procedures:  Procedure(s) (LRB):  Catheterization, Heart, Combined Right and Retrograde Left, for Congenital Heart Defect (N/A)  Angiogram, Pulmonary, Pediatric  Stent, Pulmonary Artery, Pediatric  PTA, Pulmonary Branch, Pediatric    Assistant Surgeon's Certification of Necessity:  I understand that section 1842 (b) (6) (d) of the Social Security Act generally prohibits Medicare Part B reasonable charge payment for the services of assistants at surgery in teaching hospitals when qualified residents are available to furnish such services. I certify that the services for which payment is claimed were medically necessary, and that no qualified resident was available to perform the services. I further understand that these services are subject to post-payment review by the Medicare carrier.      Sheree Chen MD    07/11/2025  2:49 PM

## 2025-07-11 NOTE — Clinical Note
The PA catheter was repositioned to the right pulmonary artery. Hemodynamics were performed. O2 saturation was measured at 76%.

## 2025-07-11 NOTE — ANESTHESIA PREPROCEDURE EVALUATION
07/11/2025  Cristobal Kidd III is a 54 y.o., male for heart cath and LPA stent.    Patient Active Problem List   Diagnosis    Prolongation of QRS complex on electrocardiography    HTN (hypertension)    Ventricular tachycardia    Symptomatic bradycardia    RBBB    CHF (congestive heart failure), NYHA class II, chronic, combined    Tetralogy of Fallot s/p repair    Adult congenital heart disease    Congenital stenosis of left pulmonary artery    Pulmonary insufficiency and pulmonary stenosis    Pulmonary valve replaced             Pre-op Assessment    I have reviewed the Patient Summary Reports.     I have reviewed the Nursing Notes. I have reviewed the NPO Status.   I have reviewed the Medications.     Review of Systems  Cardiovascular:     Hypertension    Dysrhythmias   CHF                                       Physical Exam  General: Well nourished    Airway:  Mallampati: I / I  Mouth Opening: Normal  TM Distance: Normal  Tongue: Normal  Neck ROM: Normal ROM    Dental:  Intact        Anesthesia Plan  Type of Anesthesia, risks & benefits discussed:    Anesthesia Type: Gen ETT  Intra-op Monitoring Plan: Standard ASA Monitors  Post Op Pain Control Plan: multimodal analgesia  Induction:  IV  Airway Plan: Direct, Post-Induction  Informed Consent: Informed consent signed with the Patient and all parties understand the risks and agree with anesthesia plan.  All questions answered. Patient consented to blood products? Yes  ASA Score: 3  Day of Surgery Review of History & Physical: H&P Update referred to the surgeon/provider.    Ready For Surgery From Anesthesia Perspective.     .    Results for orders placed during the hospital encounter of 05/15/25    Echo    Interpretation Summary  CONGENITAL CARDIAC HISTORY:  Tetralogy of Fallot  S/P Surgery at 3 years old (midline sternotomy) - [facility].  S/P  Surgery at 3 years old (midline sternotomy) - [facility].  S/P Medtronic ICD implantation for ventricular tachycardia - device and leads are MRI compatible.  S/P Percutaneous 25 [place] valve implantation in pulmonary position - April (9/28/2023).    SEGMENTAL CARDIAC CONNECTIONS (previously demonstrated):  Atrial situs solitus.  Atrioventricular alignment is concordant.  D-loop ventricles.  The tricuspid valve appears grossly normal.  The mitral valve appears grossly normal.  There is moderate to severe dilation of the right ventricle.  The left ventricle appears qualitatively normal.  Stent mounted valve in pulmonary position.  Normal trileaflet aortic valve.  Cardiac position is levocardia.  There is a left aortic arch.  No coarctation is demonstrated.      IMPRESSION:  Qualitative impression of mild to moderate dilation of the right atrium.  Images consistent with defibrillator lead crossing tricuspid valve with moderate to severe insufficiency.  Qualitative impression of at least moderately dilated right ventricle with normal systolic function.  Right ventricular pressure estimated 30 mmHg above right atrial pressure from well defined TR doppler profile.  Echodensity consistent with patch repair of ventricular septal defect and malalignment of the ventricular septum with no residual shunt demonstrated.  Imaging consistent with stent mounted percutaneous valve in pulmonary position with peak velocity <2 m/sec, mean gradient <9 mm Hg and trivial insufficiency.  Normal-sized main and confluent proximal pulmonary arteries.  The left atrial volume index is normal measuring 17 ml/m2.  Qualitative impression of normal left ventricular size and structure.  Very flattened septal motion with reasonable movement of the LV free wall, SF = 33% and EF estimated 50 -55% from apical views.  Global left ventricular longitudinal strain decreased- peak average measured -15.8%.  There is a trileaflet aortic  valve with large annulus, peak velocity <1.4 m/sec and trivial to mild insufficiency.  Aortic dimensions:  Sinuses of Valsalva = 34 mm.  ST junction              = 31 mm.  Ascending aorta      = 39 mm.  Qualitative impression of mildly dilated ascending aorta with otherwise normal aortic arch and left aortic branching and.  There is no evidence for coarctation.  No pericardial effusion.  Lab Results   Component Value Date    WBC 10.37 07/11/2025    HGB 12.5 (L) 07/11/2025    HCT 37.3 (L) 07/11/2025    MCV 95 07/11/2025     07/11/2025       BMP  Lab Results   Component Value Date     07/11/2025    K 4.3 07/11/2025     07/11/2025    CO2 21 (L) 07/11/2025    BUN 15 07/11/2025    CREATININE 1.0 07/11/2025    CALCIUM 9.4 07/11/2025    ANIONGAP 8 07/11/2025    EGFRNORACEVR >60 07/11/2025

## 2025-07-11 NOTE — Clinical Note
11 ml of contrast were injected throughout the case. 189 mL of contrast was the total wasted during the case. 200 mL was the total amount used during the case. Opt out

## 2025-07-11 NOTE — ANESTHESIA POSTPROCEDURE EVALUATION
Anesthesia Post Evaluation    Patient: Cristobal Baileyantino III    Procedure(s) Performed: Procedure(s) (LRB):  Catheterization, Heart, Combined Right and Retrograde Left, for Congenital Heart Defect (N/A)  Angiogram, Pulmonary, Pediatric  Stent, Pulmonary Artery, Pediatric  PTA, Pulmonary Branch, Pediatric    Final Anesthesia Type: general      Patient location during evaluation: PACU  Patient participation: Yes- Able to Participate  Level of consciousness: awake and alert and awake  Post-procedure vital signs: reviewed and stable  Pain management: adequate  Airway patency: patent    PONV status at discharge: No PONV  Anesthetic complications: no      Cardiovascular status: blood pressure returned to baseline  Respiratory status: unassisted and spontaneous ventilation  Hydration status: euvolemic  Follow-up not needed.              Vitals Value Taken Time   /56 07/11/25 15:17   Temp 37 07/11/25 15:30   Pulse 60 07/11/25 15:29   Resp 22 07/11/25 15:11   SpO2 94 % 07/11/25 15:29   Vitals shown include unfiled device data.      No case tracking events are documented in the log.      Pain/Buck Score: No data recorded

## 2025-07-11 NOTE — HOSPITAL COURSE
Cristobal admitted to the hospital on July 11, 2025 for a scheduled cath lab procedure. Found to be amenable to catheter based intervention on his LPA stenosis. Blanchard valve functioning well with no gradient nor insufficiency. Normal right and left heart hemodynamics. He was found to have severe LPA stenosis that improved with a 16 mm diameter stent with no residual gradient. Cristobal then went to the cath recovery area for routine observation. After an appropriate amount of time, he met discharge criteria and was discharged home in stable condition.

## 2025-07-11 NOTE — Clinical Note
Manual pressure was applied to the right femoral vein and left femoral artery sheath insertion site.

## 2025-07-11 NOTE — LETTER
July 11, 2025        Tejinder Encarnacion MD  [address] [place], [place] [address]Amy FunesKettering Health [facility] [address]                [address]  [facility] [address]  Phone: [phone]  Fax: [phone]   Patient: Cristobal Kidd III   MR Number: [id number]   YOB: 1970   Date of Visit: 5/16/2025     Dear Dr. Encarnacion:    Thank you for referring Cristobal Kidd to [facility] for evaluation. Attached is the Cardiac Catheterization Procedure Report. On the following pages, please find a summary of today's catheterization procedure.     If you have questions, please do not hesitate to contact us. Our [facility] team is here to support Cristobal along with you.    Sincerely,        Susan Delcid PA-C                                      Summary         The estimated blood loss was none.     DATE OF CARDIAC CATHETERIZATION: 7/11/2025    PRIMARY CARDIOLOGIST: Shane Meyer MD    ASSISTANT CARDIOLOGIST:  Sheree davalos MD    PROCEDURES: RHC/retrograde LHC (congenital/complex)    INDICATION FOR PROCEDURE:  repaired TOF withLPA stenosis, history of [place] PPVI    ANGIOS: PA    INTERVENTIONS:  LPA stent 16 mm x 26 mm EV 3 Gilberto LD    PROCEDURE TIME:  49 min    FLUORO TIME:  8.5 min    CONTRAST TOTAL:  11 cc    ACCESS:  RFV 12 fr, LFA 4fr    ESTIMATED BLOOD LOSS:  5 cc    ANESTHESIA:  GETA    ANTICOAGULATION:  heparin 5000 U IV, protamine 40 mg    ANTIBIOTICS:  Ancef    COMPLICATIONS:  none evident    NARRATIVE DESCRIPTION:  The patient is a 54-year-old male with repaired tetralogy of Fallot, percutaneous [place] pulmonary valve implantation and severe LPA stenosis submitted a catheterization for hemodynamic and angiographic reassessment and for LPA intervention if suitable.  The procedure was made complex by anatomy and by intended procedure.  Owing to the complex nature an assistant cardiologist was required.  Dr. Chen was present and participated throughout the  procedure.    DESCRIPTION OF PROCEDURE:  The patient was positioned on the catheterization table and anesthetized by the attending anesthesia team.  Both groins were prepared and draped in a sterile manner.  Local anesthetic was infiltrated.  Using micropuncture technique and 2D ultrasound guidance, a 7 Niuean sheath was placed percutaneously in the right common femoral vein and a 4 Niuean sheath in the left common femoral artery without difficulty.  Heparin and Ancef were administered.  The arterial sheath was used for pressure monitoring.       A retrograde left heart catheterization was done through the arterial sheath with a 4 Niuean pigtail catheter over a standard J guidewire obtaining pressure and saturation data from the descending aorta, ascending aorta and left ventricle.       A prograde right heart catheterization was done through the venous sheath with a 7 Niuean wedge catheter obtaining pressure and saturation data from the SVC, RA, RV, MPA, RPA and LPA.  Bilateral wedge pressures were recorded.       The wedge catheter was advanced through the right heart with the balloon fully inflated into the MPA then with a glide wire into the distal LPA.  An Amplatz extra stiff exchange length guidewires was placed.  The diagnostic catheter and short sheath were removed.  A 12 Niuean [place] DrySeal sheath was introduced over the wire and advanced to the mid LPA.  A 26 mm long EV3 seema LD stent was loaded on a 16 mm x 4 cm BIB balloon and advanced to the LPA stenosis.  The inner balloon was inflated followed by check angiogram through the [place] sheath.  The outer balloon was inflated, both to a maximum of 5 atmospheres resulting in good stent positioned.  The distal extent of the stent was post dilated with the outer balloon at 6 atmospheres.  The balloon was removed without difficulties.     A 7 Niuean wedge catheter was inserted over the guidewire and advanced to the LPA.  Simultaneous LPA and MPA and simultaneous  MPA and RV pressures were recorded.  A check angiogram was done through the [place] sheath in the MPA with the guidewire out.       Noting a satisfactory result, all catheters, guidewires and sheaths were removed intact.  Hemostasis was obtained by direct compression.  Having tolerated the procedure well, the patient was allowed to awake from anesthesia and was transported to the monitoring unit awake and in stable condition with normal vital signs.    HEMODYNAMICS:  See Ped Cath Report    ANGIOGRAMS:  1. MPA:  MPA injection shows good opacification.  No pulmonary valve insufficiency is seen.  The Alexander valve is positioned appropriately.  Severe LPA origin stenosis, minimum diameter 4 mm is present with a mid LPA diameter of 16 mm.  2. LPA stent:  Spot images show the progression of LPA stent placement.  The 26 mm long EV3 stent is placed as intended extending to cells into the MPA.  No residual stenosis or complications were seen.  After post-dilation distally, the stent matches the surrounding mid LPA well.    IMPRESSION:  1. Repaired tetralogy of Fallot status post Alexander percutaneous pulmonary valve implantation.  2. Normal Alexander valve function, no gradient, no insufficiency.  3. Severe LPA stenosis minimum diameter 4 mm, gradient 22 mmHg improved to 16 mm diameter, no gradient with 16 mm stent.  4. Normal right and left heart hemodynamics otherwise.     Shane Meyer MD

## 2025-07-11 NOTE — HPI
Cristobal Kidd III is a 54 y.o. male followed in the [facility] clinic by Dr. Galvan for tetralogy of Fallot. He is now status post 25 mm [place] valve implantation September 28, 2023 with excellent results. He also has a history of ventricular tachycardia status post Medtronic ICD implantation. He also has borderline pulmonary hypertension in the right pulmonary artery exacerbated by severe left pulmonary artery stenosis. He continues to have significant proximal LPA narrowing and has been scheduled today for the cath lab in hopes that he is a candidate for a cath-based intervention on his LPA. On his 2023 cardiac MRI, 88% of the blood flow went to the right lung. He continues with significant tricuspid insufficiency. He has done well since his 2023 [place] valve implantation with improved exercise tolerance compared to before his pulmonary valve. He does still have some dyspnea on exertion, but denies other cardiac symptoms. No edema. No syncope. No palpitations. No chest pain.

## 2025-07-11 NOTE — DISCHARGE SUMMARY
[facility] Short Stay Cardiac Unit  Adult Congenital Heart Disease   Discharge Summary      Patient Name: Cristobal Kidd III  MRN: [id number]  Admission Date: 7/11/2025  Hospital Length of Stay: 0 days  Discharge Date and Time: 07/11/2025 3:54 PM  Attending Physician: Sheree Chen III., *    Discharging Provider: Susan Delcid PA-C  Primary Care Physician: Johnnie Ackerman MD    HPI:   Cristobal Kidd III is a 54 y.o. male followed in the [facility] clinic by Dr. Galvan for tetralogy of Fallot. He is now status post 25 mm [place] valve implantation September 28, 2023 with excellent results. He also has a history of ventricular tachycardia status post Medtronic ICD implantation. He also has borderline pulmonary hypertension in the right pulmonary artery exacerbated by severe left pulmonary artery stenosis. He continues to have significant proximal LPA narrowing and has been scheduled today for the cath lab in hopes that he is a candidate for a cath-based intervention on his LPA. On his 2023 cardiac MRI, 88% of the blood flow went to the right lung. He continues with significant tricuspid insufficiency. He has done well since his 2023 [place] valve implantation with improved exercise tolerance compared to before his pulmonary valve. He does still have some dyspnea on exertion, but denies other cardiac symptoms. No edema. No syncope. No palpitations. No chest pain.    Procedure(s) (LRB):  Catheterization, Heart, Combined Right and Retrograde Left, for Congenital Heart Defect (N/A)  Angiogram, Pulmonary, Pediatric  Stent, Pulmonary Artery, Pediatric  PTA, Pulmonary Branch, Pediatric     Indwelling Lines/Drains at time of discharge:  Lines/Drains/Airways       None          Hospital Course:  Cristobal admitted to the hospital on July 11, 2025 for a scheduled cath lab procedure. Found to be amenable to catheter based intervention on his LPA stenosis. [place] valve functioning well with no gradient nor insufficiency. Normal  right and left heart hemodynamics. He was found to have severe LPA stenosis that improved with a 16 mm diameter stent with no residual gradient. Cristobal then went to the cath recovery area for routine observation. After an appropriate amount of time, he met discharge criteria and was discharged home in stable condition.    Significant Diagnostic Studies:     CMP  Sodium   Date Value Ref Range Status   07/11/2025 137 136 - 145 mmol/L Final     Potassium   Date Value Ref Range Status   07/11/2025 4.3 3.5 - 5.1 mmol/L Final     Chloride   Date Value Ref Range Status   07/11/2025 108 95 - 110 mmol/L Final     CO2   Date Value Ref Range Status   07/11/2025 21 (L) 23 - 29 mmol/L Final     Glucose   Date Value Ref Range Status   07/11/2025 92 70 - 110 mg/dL Final     BUN   Date Value Ref Range Status   07/11/2025 15 6 - 20 mg/dL Final     Creatinine   Date Value Ref Range Status   07/11/2025 1.0 0.5 - 1.4 mg/dL Final     Calcium   Date Value Ref Range Status   07/11/2025 9.4 8.7 - 10.5 mg/dL Final     Anion Gap   Date Value Ref Range Status   07/11/2025 8 8 - 16 mmol/L Final     eGFR   Date Value Ref Range Status   07/11/2025 >60 >60 mL/min/1.73/m2 Final     Comment:     Estimated GFR calculated using the CKD-EPI creatinine (2021) equation.   07/24/2024 >60.0 >60 mL/min/1.73 m^2 Final     Lab Results   Component Value Date    WBC 10.37 07/11/2025    HGB 12.5 (L) 07/11/2025    HCT 37.3 (L) 07/11/2025    MCV 95 07/11/2025     07/11/2025     Cardiac Cath 7/11/25  1. MPA: MPA injection shows good opacification. No pulmonary valve insufficiency is seen. The [place] valve is positioned appropriately. Severe LPA origin stenosis, minimum diameter 4 mm is present with a mid LPA diameter of 16 mm.  2. LPA stent: Spot images show the progression of LPA stent placement. The 26 mm long EV3 stent is placed as intended extending to cells into the MPA. No residual stenosis or complications were seen. After post-dilation distally,  the stent matches the surrounding mid LPA well.    1. Repaired tetralogy of Fallot status post [place] percutaneous pulmonary valve implantation.  2. Normal [place] valve function, no gradient, no insufficiency.  3. Severe LPA stenosis minimum diameter 4 mm, gradient 22 mmHg improved to 16 mm diameter, no gradient with 16 mm stent.  4. Normal right and left heart hemodynamics otherwise.      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 followed by Dr. Galvan in the [facility] clinic for tetralogy of Fallot. To summarize his diagnoses are as follow:  1. History of tetralogy of Fallot status post 2 surgeries at [facility], 1st at about 3 years of age, 2nd at about 7 years of age. Both performed via median sternotomy.   - severe pulmonary valve insufficiency with severe right ventricular enlargement and preserved function now status post 25 mm [place] valve implantation September 28, 2023 with excellent result  - no stenosis or insufficiency of the new pulmonary valve  - dramatic improvement in RV size (now normal with normal function)  - likely moderate Tricuspid insufficiency with ICD crossing the TV  - significant proximal LPA narrowing   2. History of ventricular tachycardia status post Medtronic ICD implantation  - device and leads are MRI compatible. Generator model number is [id number], pacer lead [id number], ICD lead [id number].   - now status post ablation procedure April 24, 2023 by Dr. Albert  - followed by Dr. Encarnacion  3. Borderline  pulmonary hypertension in the right pulmonary artery exacerbated by severe left pulmonary artery stenosis  4. Reported history of nonischemic cardiomyopathy  - currently with normal left ventricular function. LVEF on MRI improved from 44 to 55% on MRI after pulmonary valve replacement.  5. Hypertension  6. Family history of coronary artery disease, father with history  - normal coronary arteries on catheterization September 2023  7. Mild aortic insufficiency with mild aortic root enlargement, typical for tetralogy    Plan:  Scheduled today for the cath lab for LPA dilation/stent.  He did very well in this procedure and was deemed ready for discharge the same day.  Follow up with Nino Encarnacion (approx 6 months) and Dr. Galvan (approx 1 year)        Discharged Condition: stable    Disposition: Home or Self Care    Follow Up:   Follow-up Information       John Galvan MD Follow up on 6/18/2026.    Specialties: Pediatric Cardiology, Cardiology  Why: At a minimum, Follow-up with Dr. Galvan in 1 year with repeat echocardiogram, EKG.  Contact information:  [address] [facility] [address]  [phone]               Tejinder Encarnacion MD Follow up on 11/27/2025.    Specialty: Cardiology  Why: 6 month follow up with Dr. Encarnacion  Contact information:  [address] [facility] [address]  [phone]                           Patient Instructions:      Diet Adult Regular     Notify your health care provider if you experience any of the following:  redness, tenderness, or signs of infection (pain, swelling, redness, odor or green/yellow discharge around incision site)     Notify your health care provider if you experience any of the following:  severe uncontrolled pain     Notify your health care provider if you experience any of the following:  persistent nausea and vomiting or diarrhea     Notify your health care provider if you experience any of the following:  temperature >100.4     Activity as tolerated      Medications:  Reconciled Home Medications:      Medication List        CHANGE how you take these medications      furosemide 20 MG tablet  Commonly known as: LASIX  Daily as needed for weight gain 2-3 lb in 24 hours, shortness of breath, swelling  What changed:   how much to take  how to take this  when to take this  reasons to take this            CONTINUE taking these medications      amiodarone 100 MG Tab  Commonly known as: PACERONE  Take 100 mg by mouth twice a week.     aspirin 81 MG Chew  Take 1 tablet (81 mg total) by mouth once daily.     atorvastatin 20 MG tablet  Commonly known as: LIPITOR  Take 20 mg by mouth once daily.     carvediloL 12.5 MG tablet  Commonly known as: COREG  Take 1 tablet (12.5 mg total) by mouth 2 (two) times daily with meals.     ENTRESTO 24-26 mg per tablet  Generic drug: sacubitriL-valsartan  Take 1 tablet by mouth 2 (two) times daily.     spironolactone 25 MG tablet  Commonly known as: ALDACTONE  Take 1 tablet (25 mg total) by mouth 2 (two) times daily.              Time spent on the discharge of patient: 60 minutes    Susan Delcid PA-C  Adult Congenital Heart Disease

## 2025-07-12 ENCOUNTER — NURSE TRIAGE (OUTPATIENT)
Dept: ADMINISTRATIVE | Facility: CLINIC | Age: 55
End: 2025-07-12
Payer: COMMERCIAL

## 2025-07-12 NOTE — OR NURSING
Patient is ready for discharge. Patient stable alert and oriented. IV removed. No complaints of pain. Reviewed discharge instructions and answered questions with patient and family. Patient transported to vehicle per wheelchair.

## 2025-07-12 NOTE — OR NURSING
Patient awake. O2 sats low to mid 90s on room air. Patient stated that he feels fine and would prefer to be discharged home but he would be ok to stay overnight if warranted. Dr. Chen notified of decreased sats to upper 80s on room air while asleep. VSS. Ok to discharge home. Patient encouraged to continue to cough and deep breath.

## 2025-07-12 NOTE — TELEPHONE ENCOUNTER
"Caller states that he has a HA that woke him up out of his sleep. Caller states that tylenol has not helped with pain.  Pt advised ED per protocol and verbalized understanding.     Reason for Disposition   [1] SEVERE headache (e.g., excruciating) AND [2] "worst headache" of life    Additional Information   Negative: Difficult to awaken or acting confused (e.g., disoriented, slurred speech)   Negative: [1] Weakness of the face, arm or leg on one side of the body AND [2] new-onset   Negative: [1] Numbness of the face, arm or leg on one side of the body AND [2] new-onset   Negative: [1] Loss of speech or garbled speech AND [2] new-onset   Negative: Passed out (e.g., fainted, lost consciousness, blacked out and was not responding)   Negative: Sounds like a life-threatening emergency to the triager    Protocols used: Headache-A-AH    "

## 2025-07-12 NOTE — OR NURSING
Jewels GASPAR notified of decreased O2 sats to 87-88% while sleeping on room air. Improved to mid-upper 90s while awake. Encouraged coughing and deep breathing. O2 6 lpm via simple facemask applied while asleep.

## 2025-07-14 ENCOUNTER — PATIENT MESSAGE (OUTPATIENT)
Dept: CARDIOLOGY | Facility: CLINIC | Age: 55
End: 2025-07-14
Payer: COMMERCIAL

## 2025-07-14 ENCOUNTER — TELEPHONE (OUTPATIENT)
Dept: PEDIATRIC CARDIOLOGY | Facility: HOSPITAL | Age: 55
End: 2025-07-14
Payer: COMMERCIAL

## 2025-07-14 LAB
GLUCOSE SERPL-MCNC: 90 MG/DL (ref 70–110)
HCO3 UR-SCNC: 22.7 MMOL/L (ref 24–28)
HCT VFR BLD CALC: 31 %PCV (ref 36–54)
PCO2 BLDA: 37.6 MMHG (ref 35–45)
PH SMN: 7.39 [PH] (ref 7.35–7.45)
PO2 BLDA: 75 MMHG (ref 80–100)
POC ACTIVATED CLOTTING TIME K: 199 SEC (ref 74–137)
POC ACTIVATED CLOTTING TIME K: 222 SEC (ref 74–137)
POC BE: -2 MMOL/L (ref -2–2)
POC IONIZED CALCIUM: 1.16 MMOL/L (ref 1.06–1.42)
POC SATURATED O2: 95 % (ref 95–100)
POC TCO2: 24 MMOL/L (ref 23–27)
POTASSIUM BLD-SCNC: 3.6 MMOL/L (ref 3.5–5.1)
SAMPLE: ABNORMAL
SODIUM BLD-SCNC: 141 MMOL/L (ref 136–145)

## 2025-07-14 NOTE — TELEPHONE ENCOUNTER
Patient had catheterization procedure last week.  He called our on-call nurse over the weekend due to a headache.  I called him today to check on him.  A message was left on the phone.  I sent a portal message as well.

## 2025-07-15 LAB
ABO + RH BLD: NORMAL
ABO + RH BLD: NORMAL
BLD PROD TYP BPU: NORMAL
BLD PROD TYP BPU: NORMAL
BLOOD UNIT EXPIRATION DATE: NORMAL
BLOOD UNIT EXPIRATION DATE: NORMAL
BLOOD UNIT TYPE CODE: 6200
BLOOD UNIT TYPE CODE: 6200
CROSSMATCH INTERPRETATION: NORMAL
CROSSMATCH INTERPRETATION: NORMAL
DISPENSE STATUS: NORMAL
DISPENSE STATUS: NORMAL
UNIT NUMBER: NORMAL
UNIT NUMBER: NORMAL

## 2025-07-16 ENCOUNTER — CARDIOLOGY CONFERENCE (OUTPATIENT)
Dept: CARDIOLOGY | Facility: HOSPITAL | Age: 55
End: 2025-07-16
Payer: COMMERCIAL

## 2025-07-16 NOTE — PROGRESS NOTES
Patient: Cristobal Kidd, [id number], 55 yo, 71 kg, TOF, Cath for LPA stent (TY) 7/11    This patient's plan of care was [facility]. All team members were present, including [facility], congenital cardiac anesthesiologists, CV surgeons, interventional cardiologists and the [facility] , PA, and nurse navigator.     Discussion:  [place] valve placed 2 years ago. Has done really great and shown improvement since then  Brought him back recently to stent the LPA. Procedure went well with no significant gradient after that  Can follow up with [facility] at his regular annual appt with echo and EKG    Susan Delcid PA-C  Adult Congenital Heart Disease  Pediatric Cardiology

## 2025-07-20 ENCOUNTER — PATIENT MESSAGE (OUTPATIENT)
Dept: CARDIOLOGY | Facility: CLINIC | Age: 55
End: 2025-07-20
Payer: COMMERCIAL

## 2025-08-11 ENCOUNTER — PATIENT MESSAGE (OUTPATIENT)
Dept: CARDIOLOGY | Facility: CLINIC | Age: 55
End: 2025-08-11
Payer: COMMERCIAL

## 2025-08-11 ENCOUNTER — TELEPHONE (OUTPATIENT)
Dept: CARDIOLOGY | Facility: CLINIC | Age: 55
End: 2025-08-11
Payer: COMMERCIAL

## 2025-08-11 DIAGNOSIS — Z87.74 TETRALOGY OF FALLOT S/P REPAIR: Primary | ICD-10-CM

## 2025-08-11 DIAGNOSIS — Z95.2 PULMONARY VALVE REPLACED: ICD-10-CM

## 2025-08-11 DIAGNOSIS — Q25.6 CONGENITAL STENOSIS OF LEFT PULMONARY ARTERY: ICD-10-CM

## 2025-08-11 DIAGNOSIS — I37.2 NONRHEUMATIC PULMONARY VALVE STENOSIS WITH INSUFFICIENCY: ICD-10-CM

## 2025-08-11 DIAGNOSIS — Q24.9 ADULT CONGENITAL HEART DISEASE: ICD-10-CM

## (undated) DEVICE — SHEATH INTRODUCER 7FR 11CM

## (undated) DEVICE — INFLATOR ENCORE 26 BLLN INFL

## (undated) DEVICE — CATH INFINITI 4F JL4 .042X100

## (undated) DEVICE — VISE RADIFOCUS MULTI TORQUE

## (undated) DEVICE — VISIPAQUE CONTRAST 320MG/100ML

## (undated) DEVICE — Device

## (undated) DEVICE — DEVICE COMPR SAFEGUARD 24CM

## (undated) DEVICE — DILATOR COONS TAPER 14FR

## (undated) DEVICE — SYR MARK 7 ARTERION 150ML

## (undated) DEVICE — CATH WEDGE 7FRX110CM

## (undated) DEVICE — TUBING PRSS MON M/M LL 72IN

## (undated) DEVICE — KIT MICROINTRO 4F .018X40X7CM

## (undated) DEVICE — SHEATH PINNACLE INTRO .035 4FR

## (undated) DEVICE — GUIDEWIRE EMERALD .035IN 260CM

## (undated) DEVICE — CATH INFINITI JUDKINS JR4

## (undated) DEVICE — PACK PEDIATRIC ANGIOGRAPHY OMC

## (undated) DEVICE — CATH EMPULSE ANGLED 5FR PIGTAI

## (undated) DEVICE — GUIDEWIRE EMERALD 150CM PTFE

## (undated) DEVICE — BLLN BIB 16X4X110

## (undated) DEVICE — KIT CUSTOM MANIFOLD

## (undated) DEVICE — SHEATH DRYSEAL 26FRX65CM

## (undated) DEVICE — COVER PROBE US 5.5X58L NON LTX

## (undated) DEVICE — GUIDEWIRE AMPLATZ .035X260 SS

## (undated) DEVICE — KIT MNTR POLE MT DUL 12&48 MAC

## (undated) DEVICE — VISIPAQUE 320 200ML +PK

## (undated) DEVICE — COVER TABLE 44X90 STERILE

## (undated) DEVICE — GUIDEWIRE STD .035X180CM ANG

## (undated) DEVICE — CATH ANG PIGTAIL 4FR INFINITY

## (undated) DEVICE — STOPCOCK 3-WAY

## (undated) DEVICE — BOWL STERILE LARGE 32OZ

## (undated) DEVICE — LINE 60IN PRESSURE MON.

## (undated) DEVICE — SCALPEL SZ 11 RETRACTABLE

## (undated) DEVICE — SPIKE SHORT LG BORE 1-WAY 2IN

## (undated) DEVICE — GUIDEWIRE SUPRA CORE 035 300CM

## (undated) DEVICE — SHEATH DRYSEAL 12FR 65CM 4.7MM

## (undated) DEVICE — CATH DXTERITY PG145 110CM 6FR

## (undated) DEVICE — TRAY SUT REM SCISSOR FORCEP

## (undated) DEVICE — GUIDEWIRE STF .035X180CM ANG

## (undated) DEVICE — CUP MEDICINE STERILE 2OZ

## (undated) DEVICE — WIRE LUNDERQUIST 260CM

## (undated) DEVICE — SHEATH INTRODUCER 4FR 11CM

## (undated) DEVICE — COVER BAND BAG 28 X 12

## (undated) DEVICE — PROTECTION STATION PLUS